# Patient Record
Sex: MALE | NOT HISPANIC OR LATINO | URBAN - METROPOLITAN AREA
[De-identification: names, ages, dates, MRNs, and addresses within clinical notes are randomized per-mention and may not be internally consistent; named-entity substitution may affect disease eponyms.]

---

## 2024-11-18 ENCOUNTER — ANESTHESIA (OUTPATIENT)
Dept: SURGERY | Facility: HOSPITAL | Age: 49
End: 2024-11-18
Payer: MEDICARE

## 2024-11-18 ENCOUNTER — ANESTHESIA EVENT (OUTPATIENT)
Dept: SURGERY | Facility: HOSPITAL | Age: 49
End: 2024-11-18
Payer: MEDICARE

## 2024-11-18 ENCOUNTER — HOSPITAL ENCOUNTER (INPATIENT)
Facility: HOSPITAL | Age: 49
LOS: 17 days | Discharge: NURSING, REHAB OR LONG-TERM CARE | DRG: 041 | End: 2024-12-05
Attending: STUDENT IN AN ORGANIZED HEALTH CARE EDUCATION/TRAINING PROGRAM | Admitting: SURGERY
Payer: MEDICARE

## 2024-11-18 DIAGNOSIS — S02.91XA CLOSED FRACTURE OF SKULL, UNSPECIFIED BONE, INITIAL ENCOUNTER: ICD-10-CM

## 2024-11-18 DIAGNOSIS — T14.90XA TRAUMA: ICD-10-CM

## 2024-11-18 DIAGNOSIS — S51.011S ELBOW LACERATION, RIGHT, SEQUELA: ICD-10-CM

## 2024-11-18 DIAGNOSIS — S06.5XAA SDH (SUBDURAL HEMATOMA): ICD-10-CM

## 2024-11-18 DIAGNOSIS — S82.251A CLOSED DISPLACED COMMINUTED FRACTURE OF SHAFT OF RIGHT TIBIA, INITIAL ENCOUNTER: ICD-10-CM

## 2024-11-18 DIAGNOSIS — S53.431A COMPLETE TEAR OF LATERAL COLLATERAL LIGAMENT OF RIGHT ELBOW: ICD-10-CM

## 2024-11-18 DIAGNOSIS — S82.451A CLOSED DISPLACED COMMINUTED FRACTURE OF SHAFT OF RIGHT FIBULA, INITIAL ENCOUNTER: ICD-10-CM

## 2024-11-18 DIAGNOSIS — R07.9 CHEST PAIN: ICD-10-CM
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
## 2024-11-18 PROBLEM — S82.401A FRACTURE OF RIGHT FIBULA: Status: ACTIVE | Noted: 2024-11-18

## 2024-11-18 PROBLEM — S56.521A: Status: ACTIVE | Noted: 2024-11-18

## 2024-11-18 PROBLEM — Z96.9 RETAINED ORTHOPEDIC HARDWARE: Status: ACTIVE | Noted: 2024-11-18

## 2024-11-18 PROBLEM — S51.011A LACERATION OF RIGHT ELBOW: Status: ACTIVE | Noted: 2024-11-18

## 2024-11-18 PROBLEM — S82.201A FRACTURE OF RIGHT TIBIA: Status: ACTIVE | Noted: 2024-11-18

## 2024-11-18 PROBLEM — I60.9 SAH (SUBARACHNOID HEMORRHAGE): Status: ACTIVE | Noted: 2024-11-18

## 2024-11-18 LAB
ABORH RETYPE: NORMAL
ABS NEUT (OLG): 15.48 X10(3)/MCL (ref 2.1–9.2)
ALBUMIN SERPL-MCNC: 3.9 G/DL (ref 3.5–5)
ALBUMIN/GLOB SERPL: 1.5 RATIO (ref 1.1–2)
ALP SERPL-CCNC: 60 UNIT/L (ref 40–150)
ALT SERPL-CCNC: 23 UNIT/L (ref 0–55)
AMPHET UR QL SCN: POSITIVE
ANION GAP SERPL CALC-SCNC: 9 MEQ/L
APTT PPP: 24.9 SECONDS (ref 23.2–33.7)
AST SERPL-CCNC: 34 UNIT/L (ref 5–34)
BACTERIA #/AREA URNS AUTO: ABNORMAL /HPF
BARBITURATE SCN PRESENT UR: NEGATIVE
BENZODIAZ UR QL SCN: POSITIVE
BILIRUB SERPL-MCNC: 0.4 MG/DL
BILIRUB UR QL STRIP.AUTO: NEGATIVE
BUN SERPL-MCNC: 14.8 MG/DL (ref 8.9–20.6)
CALCIUM SERPL-MCNC: 9.1 MG/DL (ref 8.4–10.2)
CANNABINOIDS UR QL SCN: NEGATIVE
CHLORIDE SERPL-SCNC: 106 MMOL/L (ref 98–107)
CLARITY UR: CLEAR
CO2 SERPL-SCNC: 27 MMOL/L (ref 22–29)
COCAINE UR QL SCN: NEGATIVE
COLOR UR AUTO: COLORLESS
CREAT SERPL-MCNC: 0.81 MG/DL (ref 0.72–1.25)
CREAT/UREA NIT SERPL: 18
EOSINOPHIL NFR BLD MANUAL: 0.19 X10(3)/MCL (ref 0–0.9)
EOSINOPHIL NFR BLD MANUAL: 1 %
ERYTHROCYTE [DISTWIDTH] IN BLOOD BY AUTOMATED COUNT: 13.4 % (ref 11.5–17)
ETHANOL SERPL-MCNC: <10 MG/DL
FENTANYL UR QL SCN: NEGATIVE
GFR SERPLBLD CREATININE-BSD FMLA CKD-EPI: >60 ML/MIN/1.73/M2
GLOBULIN SER-MCNC: 2.6 GM/DL (ref 2.4–3.5)
GLUCOSE SERPL-MCNC: 127 MG/DL (ref 74–100)
GLUCOSE UR QL STRIP: NORMAL
GROUP & RH: NORMAL
HCT VFR BLD AUTO: 36.8 % (ref 42–52)
HCT VFR BLD AUTO: NORMAL %
HGB BLD-MCNC: 12.3 G/DL (ref 14–18)
HGB BLD-MCNC: NORMAL G/DL
HGB UR QL STRIP: NEGATIVE
INDIRECT COOMBS: NORMAL
INR PPP: 1
INSTRUMENT WBC (OLG): 19.11 X10(3)/MCL
KETONES UR QL STRIP: NEGATIVE
LACTATE SERPL-SCNC: 2.9 MMOL/L (ref 0.5–2.2)
LACTATE SERPL-SCNC: 2.9 MMOL/L (ref 0.5–2.2)
LACTATE SERPL-SCNC: 5.3 MMOL/L (ref 0.5–2.2)
LACTATE SERPL-SCNC: 6.7 MMOL/L (ref 0.5–2.2)
LEUKOCYTE ESTERASE UR QL STRIP: NEGATIVE
LYMPHOCYTES NFR BLD MANUAL: 0.96 X10(3)/MCL
LYMPHOCYTES NFR BLD MANUAL: 5 %
MCH RBC QN AUTO: 30.8 PG (ref 27–31)
MCHC RBC AUTO-ENTMCNC: 33.4 G/DL (ref 33–36)
MCV RBC AUTO: 92.2 FL (ref 80–94)
MDMA UR QL SCN: NEGATIVE
MONOCYTES NFR BLD MANUAL: 13 %
MONOCYTES NFR BLD MANUAL: 2.48 X10(3)/MCL (ref 0.1–1.3)
NEUTROPHILS NFR BLD MANUAL: 81 %
NITRITE UR QL STRIP: NEGATIVE
NRBC BLD AUTO-RTO: 0 %
OPIATES UR QL SCN: POSITIVE
PCP UR QL: NEGATIVE
PH UR STRIP: 8.5 [PH]
PH UR: 8.5 [PH] (ref 3–11)
PLATELET # BLD AUTO: 448 X10(3)/MCL (ref 130–400)
PLATELET # BLD EST: ABNORMAL 10*3/UL
PMV BLD AUTO: 9.7 FL (ref 7.4–10.4)
POTASSIUM SERPL-SCNC: 3.8 MMOL/L (ref 3.5–5.1)
PROT SERPL-MCNC: 6.5 GM/DL (ref 6.4–8.3)
PROT UR QL STRIP: ABNORMAL
PROTHROMBIN TIME: 12.6 SECONDS (ref 12.5–14.5)
RBC # BLD AUTO: 3.99 X10(6)/MCL (ref 4.7–6.1)
RBC #/AREA URNS AUTO: ABNORMAL /HPF
RBC MORPH BLD: NORMAL
SODIUM SERPL-SCNC: 142 MMOL/L (ref 136–145)
SP GR UR STRIP.AUTO: 1.03 (ref 1–1.03)
SPECIFIC GRAVITY, URINE AUTO (.000) (OHS): 1.03 (ref 1–1.03)
SPECIMEN OUTDATE: NORMAL
SPERM URNS QL MICRO: ABNORMAL /HPF
SQUAMOUS #/AREA URNS LPF: ABNORMAL /HPF
UROBILINOGEN UR STRIP-ACNC: NORMAL
WBC # BLD AUTO: 19.11 X10(3)/MCL (ref 4.5–11.5)
WBC #/AREA URNS AUTO: ABNORMAL /HPF

## 2024-11-18 PROCEDURE — 25000003 PHARM REV CODE 250: Performed by: NURSE ANESTHETIST, CERTIFIED REGISTERED

## 2024-11-18 PROCEDURE — 99223 1ST HOSP IP/OBS HIGH 75: CPT | Mod: 57,FS,, | Performed by: NURSE PRACTITIONER

## 2024-11-18 PROCEDURE — 24343 REPR ELBOW LAT LIGMNT W/TISS: CPT | Mod: 51,RT,, | Performed by: ORTHOPAEDIC SURGERY

## 2024-11-18 PROCEDURE — 85027 COMPLETE CBC AUTOMATED: CPT | Performed by: STUDENT IN AN ORGANIZED HEALTH CARE EDUCATION/TRAINING PROGRAM

## 2024-11-18 PROCEDURE — 86900 BLOOD TYPING SEROLOGIC ABO: CPT | Performed by: STUDENT IN AN ORGANIZED HEALTH CARE EDUCATION/TRAINING PROGRAM

## 2024-11-18 PROCEDURE — 63600175 PHARM REV CODE 636 W HCPCS

## 2024-11-18 PROCEDURE — 36415 COLL VENOUS BLD VENIPUNCTURE: CPT | Performed by: STUDENT IN AN ORGANIZED HEALTH CARE EDUCATION/TRAINING PROGRAM

## 2024-11-18 PROCEDURE — 63600175 PHARM REV CODE 636 W HCPCS: Performed by: NURSE ANESTHETIST, CERTIFIED REGISTERED

## 2024-11-18 PROCEDURE — C1769 GUIDE WIRE: HCPCS | Performed by: ORTHOPAEDIC SURGERY

## 2024-11-18 PROCEDURE — 20800000 HC ICU TRAUMA

## 2024-11-18 PROCEDURE — 63600175 PHARM REV CODE 636 W HCPCS: Performed by: SURGERY

## 2024-11-18 PROCEDURE — 0LQ50ZZ REPAIR RIGHT LOWER ARM AND WRIST TENDON, OPEN APPROACH: ICD-10-PCS | Performed by: ORTHOPAEDIC SURGERY

## 2024-11-18 PROCEDURE — 25270 REPAIR FOREARM TENDON/MUSCLE: CPT | Mod: 51,AS,RT, | Performed by: NURSE PRACTITIONER

## 2024-11-18 PROCEDURE — 89220 SPUTUM SPECIMEN COLLECTION: CPT

## 2024-11-18 PROCEDURE — 96374 THER/PROPH/DIAG INJ IV PUSH: CPT

## 2024-11-18 PROCEDURE — 25000003 PHARM REV CODE 250

## 2024-11-18 PROCEDURE — 99900035 HC TECH TIME PER 15 MIN (STAT)

## 2024-11-18 PROCEDURE — 27200966 HC CLOSED SUCTION SYSTEM

## 2024-11-18 PROCEDURE — 99900031 HC PATIENT EDUCATION (STAT)

## 2024-11-18 PROCEDURE — 36000710: Performed by: ORTHOPAEDIC SURGERY

## 2024-11-18 PROCEDURE — 0QSG06Z REPOSITION RIGHT TIBIA WITH INTRAMEDULLARY INTERNAL FIXATION DEVICE, OPEN APPROACH: ICD-10-PCS | Performed by: ORTHOPAEDIC SURGERY

## 2024-11-18 PROCEDURE — 85730 THROMBOPLASTIN TIME PARTIAL: CPT | Performed by: STUDENT IN AN ORGANIZED HEALTH CARE EDUCATION/TRAINING PROGRAM

## 2024-11-18 PROCEDURE — 99223 1ST HOSP IP/OBS HIGH 75: CPT | Mod: AI,,,

## 2024-11-18 PROCEDURE — 83605 ASSAY OF LACTIC ACID: CPT | Performed by: STUDENT IN AN ORGANIZED HEALTH CARE EDUCATION/TRAINING PROGRAM

## 2024-11-18 PROCEDURE — 0BH17EZ INSERTION OF ENDOTRACHEAL AIRWAY INTO TRACHEA, VIA NATURAL OR ARTIFICIAL OPENING: ICD-10-PCS | Performed by: STUDENT IN AN ORGANIZED HEALTH CARE EDUCATION/TRAINING PROGRAM

## 2024-11-18 PROCEDURE — 94760 N-INVAS EAR/PLS OXIMETRY 1: CPT

## 2024-11-18 PROCEDURE — 99900026 HC AIRWAY MAINTENANCE (STAT)

## 2024-11-18 PROCEDURE — D9220A PRA ANESTHESIA: Mod: ANES,,, | Performed by: ANESTHESIOLOGY

## 2024-11-18 PROCEDURE — 24343 REPR ELBOW LAT LIGMNT W/TISS: CPT | Mod: 51,AS,RT, | Performed by: NURSE PRACTITIONER

## 2024-11-18 PROCEDURE — 99221 1ST HOSP IP/OBS SF/LOW 40: CPT | Mod: FS,57,, | Performed by: ORTHOPAEDIC SURGERY

## 2024-11-18 PROCEDURE — 63600175 PHARM REV CODE 636 W HCPCS: Performed by: ORTHOPAEDIC SURGERY

## 2024-11-18 PROCEDURE — C1713 ANCHOR/SCREW BN/BN,TIS/BN: HCPCS | Performed by: ORTHOPAEDIC SURGERY

## 2024-11-18 PROCEDURE — 36000711: Performed by: ORTHOPAEDIC SURGERY

## 2024-11-18 PROCEDURE — 51702 INSERT TEMP BLADDER CATH: CPT

## 2024-11-18 PROCEDURE — D9220A PRA ANESTHESIA: Mod: CRNA,,, | Performed by: NURSE ANESTHETIST, CERTIFIED REGISTERED

## 2024-11-18 PROCEDURE — 27100171 HC OXYGEN HIGH FLOW UP TO 24 HOURS

## 2024-11-18 PROCEDURE — 5A1945Z RESPIRATORY VENTILATION, 24-96 CONSECUTIVE HOURS: ICD-10-PCS | Performed by: STUDENT IN AN ORGANIZED HEALTH CARE EDUCATION/TRAINING PROGRAM

## 2024-11-18 PROCEDURE — 85014 HEMATOCRIT: CPT | Performed by: SURGERY

## 2024-11-18 PROCEDURE — 27759 TREATMENT OF TIBIA FRACTURE: CPT | Mod: RT,,, | Performed by: ORTHOPAEDIC SURGERY

## 2024-11-18 PROCEDURE — 80307 DRUG TEST PRSMV CHEM ANLYZR: CPT | Performed by: STUDENT IN AN ORGANIZED HEALTH CARE EDUCATION/TRAINING PROGRAM

## 2024-11-18 PROCEDURE — 94761 N-INVAS EAR/PLS OXIMETRY MLT: CPT

## 2024-11-18 PROCEDURE — 0KB90ZZ EXCISION OF RIGHT LOWER ARM AND WRIST MUSCLE, OPEN APPROACH: ICD-10-PCS | Performed by: ORTHOPAEDIC SURGERY

## 2024-11-18 PROCEDURE — 37000008 HC ANESTHESIA 1ST 15 MINUTES: Performed by: ORTHOPAEDIC SURGERY

## 2024-11-18 PROCEDURE — 31500 INSERT EMERGENCY AIRWAY: CPT

## 2024-11-18 PROCEDURE — 25000003 PHARM REV CODE 250: Performed by: SURGERY

## 2024-11-18 PROCEDURE — 25500020 PHARM REV CODE 255: Performed by: SURGERY

## 2024-11-18 PROCEDURE — 36415 COLL VENOUS BLD VENIPUNCTURE: CPT | Performed by: SURGERY

## 2024-11-18 PROCEDURE — 81015 MICROSCOPIC EXAM OF URINE: CPT | Performed by: STUDENT IN AN ORGANIZED HEALTH CARE EDUCATION/TRAINING PROGRAM

## 2024-11-18 PROCEDURE — 83605 ASSAY OF LACTIC ACID: CPT | Performed by: SURGERY

## 2024-11-18 PROCEDURE — 99223 1ST HOSP IP/OBS HIGH 75: CPT | Mod: FS,,, | Performed by: STUDENT IN AN ORGANIZED HEALTH CARE EDUCATION/TRAINING PROGRAM

## 2024-11-18 PROCEDURE — 85610 PROTHROMBIN TIME: CPT | Performed by: STUDENT IN AN ORGANIZED HEALTH CARE EDUCATION/TRAINING PROGRAM

## 2024-11-18 PROCEDURE — G0390 TRAUMA RESPONS W/HOSP CRITI: HCPCS

## 2024-11-18 PROCEDURE — 0SPF04Z REMOVAL OF INTERNAL FIXATION DEVICE FROM RIGHT ANKLE JOINT, OPEN APPROACH: ICD-10-PCS | Performed by: ORTHOPAEDIC SURGERY

## 2024-11-18 PROCEDURE — 99285 EMERGENCY DEPT VISIT HI MDM: CPT | Mod: 25

## 2024-11-18 PROCEDURE — 27201423 OPTIME MED/SURG SUP & DEVICES STERILE SUPPLY: Performed by: ORTHOPAEDIC SURGERY

## 2024-11-18 PROCEDURE — 27759 TREATMENT OF TIBIA FRACTURE: CPT | Mod: AS,RT,, | Performed by: NURSE PRACTITIONER

## 2024-11-18 PROCEDURE — 0MQ30ZZ REPAIR RIGHT ELBOW BURSA AND LIGAMENT, OPEN APPROACH: ICD-10-PCS | Performed by: ORTHOPAEDIC SURGERY

## 2024-11-18 PROCEDURE — 3E0234Z INTRODUCTION OF SERUM, TOXOID AND VACCINE INTO MUSCLE, PERCUTANEOUS APPROACH: ICD-10-PCS | Performed by: SURGERY

## 2024-11-18 PROCEDURE — 80053 COMPREHEN METABOLIC PANEL: CPT | Performed by: STUDENT IN AN ORGANIZED HEALTH CARE EDUCATION/TRAINING PROGRAM

## 2024-11-18 PROCEDURE — 25270 REPAIR FOREARM TENDON/MUSCLE: CPT | Mod: 51,RT,, | Performed by: ORTHOPAEDIC SURGERY

## 2024-11-18 PROCEDURE — 25000003 PHARM REV CODE 250: Performed by: STUDENT IN AN ORGANIZED HEALTH CARE EDUCATION/TRAINING PROGRAM

## 2024-11-18 PROCEDURE — 0RCL0ZZ EXTIRPATION OF MATTER FROM RIGHT ELBOW JOINT, OPEN APPROACH: ICD-10-PCS | Performed by: ORTHOPAEDIC SURGERY

## 2024-11-18 PROCEDURE — 82077 ASSAY SPEC XCP UR&BREATH IA: CPT | Performed by: STUDENT IN AN ORGANIZED HEALTH CARE EDUCATION/TRAINING PROGRAM

## 2024-11-18 PROCEDURE — 37000009 HC ANESTHESIA EA ADD 15 MINS: Performed by: ORTHOPAEDIC SURGERY

## 2024-11-18 PROCEDURE — 20000000 HC ICU ROOM

## 2024-11-18 PROCEDURE — 94002 VENT MGMT INPAT INIT DAY: CPT

## 2024-11-18 DEVICE — LOW PROF LCKNG SCREW F/IM NAIL Ø 5.0MM / L 30MM / XL25/ STER: Type: IMPLANTABLE DEVICE | Site: TIBIA | Status: FUNCTIONAL

## 2024-11-18 DEVICE — LOW PROF LCKNG SCREW F/IM NAIL Ø 5.0MM / L 66MM / XL25/ STER: Type: IMPLANTABLE DEVICE | Site: TIBIA | Status: FUNCTIONAL

## 2024-11-18 DEVICE — LOCKING SCREW FOR IM NAIL Ø 5MM/ 40MM/ XL25/ STERILE: Type: IMPLANTABLE DEVICE | Site: TIBIA | Status: FUNCTIONAL

## 2024-11-18 DEVICE — LOW PROF LCKNG SCREW F/IM NAIL Ø 5.0MM / L 60MM / XL25/ STER: Type: IMPLANTABLE DEVICE | Site: TIBIA | Status: FUNCTIONAL

## 2024-11-18 DEVICE — LOW PROF LCKNG SCREW F/IM NAIL Ø 5.0MM / L 34MM / XL25/ STER: Type: IMPLANTABLE DEVICE | Site: TIBIA | Status: FUNCTIONAL

## 2024-11-18 DEVICE — LOW PROF LCKNG SCREW F/IM NAIL Ø 5.0MM / L 36MM / XL25/ STER: Type: IMPLANTABLE DEVICE | Site: TIBIA | Status: FUNCTIONAL

## 2024-11-18 DEVICE — TIBIAL NAIL-ADVANCED / 10MM 360MM / STERILE: Type: IMPLANTABLE DEVICE | Site: TIBIA | Status: FUNCTIONAL

## 2024-11-18 RX ORDER — FENTANYL CITRATE 50 UG/ML
INJECTION, SOLUTION INTRAMUSCULAR; INTRAVENOUS
Status: DISCONTINUED | OUTPATIENT
Start: 2024-11-18 | End: 2024-11-18

## 2024-11-18 RX ORDER — DEXMEDETOMIDINE HYDROCHLORIDE 4 UG/ML
INJECTION, SOLUTION INTRAVENOUS
Status: DISPENSED
Start: 2024-11-18 | End: 2024-11-18

## 2024-11-18 RX ORDER — MORPHINE SULFATE 4 MG/ML
2 INJECTION, SOLUTION INTRAMUSCULAR; INTRAVENOUS
Status: DISCONTINUED | OUTPATIENT
Start: 2024-11-18 | End: 2024-11-19

## 2024-11-18 RX ORDER — DOCUSATE SODIUM 100 MG/1
100 CAPSULE, LIQUID FILLED ORAL 2 TIMES DAILY
Status: DISCONTINUED | OUTPATIENT
Start: 2024-11-18 | End: 2024-11-20

## 2024-11-18 RX ORDER — LEVETIRACETAM 500 MG/1
500 TABLET ORAL 2 TIMES DAILY
Status: DISCONTINUED | OUTPATIENT
Start: 2024-11-18 | End: 2024-11-18

## 2024-11-18 RX ORDER — TALC
6 POWDER (GRAM) TOPICAL NIGHTLY PRN
Status: DISCONTINUED | OUTPATIENT
Start: 2024-11-18 | End: 2024-12-05 | Stop reason: HOSPADM

## 2024-11-18 RX ORDER — HYDRALAZINE HYDROCHLORIDE 20 MG/ML
20 INJECTION INTRAMUSCULAR; INTRAVENOUS EVERY 4 HOURS PRN
Status: DISCONTINUED | OUTPATIENT
Start: 2024-11-18 | End: 2024-12-03

## 2024-11-18 RX ORDER — OXYCODONE HYDROCHLORIDE 5 MG/1
5 TABLET ORAL EVERY 4 HOURS PRN
Status: DISCONTINUED | OUTPATIENT
Start: 2024-11-18 | End: 2024-12-03

## 2024-11-18 RX ORDER — LEVETIRACETAM 500 MG/5ML
500 INJECTION, SOLUTION, CONCENTRATE INTRAVENOUS EVERY 12 HOURS
Status: DISCONTINUED | OUTPATIENT
Start: 2024-11-18 | End: 2024-11-23

## 2024-11-18 RX ORDER — POLYETHYLENE GLYCOL 3350 17 G/17G
17 POWDER, FOR SOLUTION ORAL 2 TIMES DAILY
Status: DISCONTINUED | OUTPATIENT
Start: 2024-11-18 | End: 2024-11-20

## 2024-11-18 RX ORDER — PHENYLEPHRINE HCL IN 0.9% NACL 1 MG/10 ML
SYRINGE (ML) INTRAVENOUS
Status: DISCONTINUED | OUTPATIENT
Start: 2024-11-18 | End: 2024-11-18

## 2024-11-18 RX ORDER — CEFAZOLIN 2 G/1
INJECTION, POWDER, FOR SOLUTION INTRAMUSCULAR; INTRAVENOUS
Status: DISCONTINUED | OUTPATIENT
Start: 2024-11-18 | End: 2024-11-18

## 2024-11-18 RX ORDER — FAMOTIDINE 10 MG/ML
20 INJECTION INTRAVENOUS 2 TIMES DAILY
Status: COMPLETED | OUTPATIENT
Start: 2024-11-18 | End: 2024-11-21

## 2024-11-18 RX ORDER — ETOMIDATE 2 MG/ML
20 INJECTION INTRAVENOUS
Status: COMPLETED | OUTPATIENT
Start: 2024-11-18 | End: 2024-11-18

## 2024-11-18 RX ORDER — ROCURONIUM BROMIDE 10 MG/ML
INJECTION, SOLUTION INTRAVENOUS
Status: DISCONTINUED | OUTPATIENT
Start: 2024-11-18 | End: 2024-11-18

## 2024-11-18 RX ORDER — LABETALOL HYDROCHLORIDE 5 MG/ML
20 INJECTION, SOLUTION INTRAVENOUS EVERY 4 HOURS PRN
Status: DISCONTINUED | OUTPATIENT
Start: 2024-11-18 | End: 2024-12-03

## 2024-11-18 RX ORDER — PROPOFOL 10 MG/ML
INJECTION, EMULSION INTRAVENOUS
Status: COMPLETED
Start: 2024-11-18 | End: 2024-11-18

## 2024-11-18 RX ORDER — ROCURONIUM BROMIDE 10 MG/ML
100 INJECTION, SOLUTION INTRAVENOUS ONCE
Status: COMPLETED | OUTPATIENT
Start: 2024-11-18 | End: 2024-11-18

## 2024-11-18 RX ORDER — VANCOMYCIN HYDROCHLORIDE 1 G/20ML
INJECTION, POWDER, LYOPHILIZED, FOR SOLUTION INTRAVENOUS
Status: DISCONTINUED | OUTPATIENT
Start: 2024-11-18 | End: 2024-11-18 | Stop reason: HOSPADM

## 2024-11-18 RX ORDER — MUPIROCIN 20 MG/G
OINTMENT TOPICAL 2 TIMES DAILY
Status: COMPLETED | OUTPATIENT
Start: 2024-11-18 | End: 2024-11-23

## 2024-11-18 RX ORDER — ACETAMINOPHEN 325 MG/1
650 TABLET ORAL EVERY 4 HOURS
Status: DISPENSED | OUTPATIENT
Start: 2024-11-18 | End: 2024-11-23

## 2024-11-18 RX ORDER — DEXMEDETOMIDINE HYDROCHLORIDE 4 UG/ML
0-1.4 INJECTION, SOLUTION INTRAVENOUS CONTINUOUS
Status: DISCONTINUED | OUTPATIENT
Start: 2024-11-18 | End: 2024-11-19

## 2024-11-18 RX ORDER — FAMOTIDINE 20 MG/1
20 TABLET, FILM COATED ORAL 2 TIMES DAILY
Status: DISCONTINUED | OUTPATIENT
Start: 2024-11-18 | End: 2024-11-18

## 2024-11-18 RX ORDER — FENTANYL CITRATE 50 UG/ML
100 INJECTION, SOLUTION INTRAMUSCULAR; INTRAVENOUS
Status: DISCONTINUED | OUTPATIENT
Start: 2024-11-18 | End: 2024-11-18

## 2024-11-18 RX ORDER — PROPOFOL 10 MG/ML
0-50 INJECTION, EMULSION INTRAVENOUS CONTINUOUS
Status: DISCONTINUED | OUTPATIENT
Start: 2024-11-18 | End: 2024-11-19

## 2024-11-18 RX ORDER — PROPOFOL 10 MG/ML
0-50 INJECTION, EMULSION INTRAVENOUS CONTINUOUS
Status: DISCONTINUED | OUTPATIENT
Start: 2024-11-18 | End: 2024-11-18 | Stop reason: HOSPADM

## 2024-11-18 RX ORDER — SODIUM CHLORIDE 9 MG/ML
INJECTION, SOLUTION INTRAVENOUS CONTINUOUS
Status: DISCONTINUED | OUTPATIENT
Start: 2024-11-18 | End: 2024-11-18

## 2024-11-18 RX ORDER — BISACODYL 10 MG/1
10 SUPPOSITORY RECTAL DAILY PRN
Status: DISCONTINUED | OUTPATIENT
Start: 2024-11-18 | End: 2024-12-05 | Stop reason: HOSPADM

## 2024-11-18 RX ORDER — FENTANYL CITRATE-0.9 % NACL/PF 10 MCG/ML
0-250 PLASTIC BAG, INJECTION (ML) INTRAVENOUS CONTINUOUS
Status: DISCONTINUED | OUTPATIENT
Start: 2024-11-18 | End: 2024-11-19

## 2024-11-18 RX ORDER — SODIUM CHLORIDE, SODIUM LACTATE, POTASSIUM CHLORIDE, CALCIUM CHLORIDE 600; 310; 30; 20 MG/100ML; MG/100ML; MG/100ML; MG/100ML
INJECTION, SOLUTION INTRAVENOUS CONTINUOUS
Status: DISCONTINUED | OUTPATIENT
Start: 2024-11-18 | End: 2024-11-20

## 2024-11-18 RX ORDER — CEFAZOLIN 2 G/1
2 INJECTION, POWDER, FOR SOLUTION INTRAMUSCULAR; INTRAVENOUS
Status: COMPLETED | OUTPATIENT
Start: 2024-11-18 | End: 2024-11-22

## 2024-11-18 RX ADMIN — Medication 100 MCG: at 02:11

## 2024-11-18 RX ADMIN — ROCURONIUM BROMIDE 20 MG: 10 SOLUTION INTRAVENOUS at 02:11

## 2024-11-18 RX ADMIN — IOHEXOL 75 ML: 350 INJECTION, SOLUTION INTRAVENOUS at 12:11

## 2024-11-18 RX ADMIN — FAMOTIDINE 20 MG: 10 INJECTION, SOLUTION INTRAVENOUS at 08:11

## 2024-11-18 RX ADMIN — PROPOFOL 10 MCG/KG/MIN: 10 INJECTION, EMULSION INTRAVENOUS at 07:11

## 2024-11-18 RX ADMIN — DEXMEDETOMIDINE HYDROCHLORIDE 0.4 MCG/KG/HR: 400 INJECTION INTRAVENOUS at 10:11

## 2024-11-18 RX ADMIN — Medication 100 MCG: at 01:11

## 2024-11-18 RX ADMIN — SODIUM CHLORIDE, POTASSIUM CHLORIDE, SODIUM LACTATE AND CALCIUM CHLORIDE 1000 ML: 600; 310; 30; 20 INJECTION, SOLUTION INTRAVENOUS at 08:11

## 2024-11-18 RX ADMIN — SODIUM CHLORIDE, POTASSIUM CHLORIDE, SODIUM LACTATE AND CALCIUM CHLORIDE: 600; 310; 30; 20 INJECTION, SOLUTION INTRAVENOUS at 09:11

## 2024-11-18 RX ADMIN — ROCURONIUM BROMIDE 50 MG: 10 SOLUTION INTRAVENOUS at 12:11

## 2024-11-18 RX ADMIN — ROCURONIUM BROMIDE 100 MG: 10 SOLUTION INTRAVENOUS at 07:11

## 2024-11-18 RX ADMIN — MUPIROCIN: 20 OINTMENT TOPICAL at 08:11

## 2024-11-18 RX ADMIN — DOCUSATE SODIUM 100 MG: 100 CAPSULE, LIQUID FILLED ORAL at 08:11

## 2024-11-18 RX ADMIN — PROPOFOL 10 MCG/KG/MIN: 10 INJECTION, EMULSION INTRAVENOUS at 08:11

## 2024-11-18 RX ADMIN — CEFAZOLIN 2 G: 2 INJECTION, POWDER, FOR SOLUTION INTRAMUSCULAR; INTRAVENOUS at 01:11

## 2024-11-18 RX ADMIN — POLYETHYLENE GLYCOL 3350 17 G: 17 POWDER, FOR SOLUTION ORAL at 08:11

## 2024-11-18 RX ADMIN — SODIUM CHLORIDE, POTASSIUM CHLORIDE, SODIUM LACTATE AND CALCIUM CHLORIDE 500 ML: 600; 310; 30; 20 INJECTION, SOLUTION INTRAVENOUS at 10:11

## 2024-11-18 RX ADMIN — CEFAZOLIN 2 G: 2 INJECTION, POWDER, FOR SOLUTION INTRAMUSCULAR; INTRAVENOUS at 08:11

## 2024-11-18 RX ADMIN — LEVETIRACETAM 500 MG: 100 INJECTION, SOLUTION INTRAVENOUS at 08:11

## 2024-11-18 RX ADMIN — SUGAMMADEX 200 MG: 100 INJECTION, SOLUTION INTRAVENOUS at 03:11

## 2024-11-18 RX ADMIN — FENTANYL CITRATE 100 MCG: 50 INJECTION, SOLUTION INTRAMUSCULAR; INTRAVENOUS at 07:11

## 2024-11-18 RX ADMIN — FENTANYL CITRATE 100 MCG: 50 INJECTION, SOLUTION INTRAMUSCULAR; INTRAVENOUS at 11:11

## 2024-11-18 RX ADMIN — DEXMEDETOMIDINE HYDROCHLORIDE 0.4 MCG/KG/HR: 400 INJECTION INTRAVENOUS at 03:11

## 2024-11-18 RX ADMIN — HYDRALAZINE HYDROCHLORIDE 20 MG: 20 INJECTION INTRAMUSCULAR; INTRAVENOUS at 09:11

## 2024-11-18 RX ADMIN — FENTANYL CITRATE 100 MCG: 50 INJECTION, SOLUTION INTRAMUSCULAR; INTRAVENOUS at 01:11

## 2024-11-18 RX ADMIN — ACETAMINOPHEN 650 MG: 325 TABLET, FILM COATED ORAL at 08:11

## 2024-11-18 RX ADMIN — CEFAZOLIN 2 G: 2 INJECTION, POWDER, FOR SOLUTION INTRAMUSCULAR; INTRAVENOUS at 03:11

## 2024-11-18 RX ADMIN — Medication 200 MCG: at 03:11

## 2024-11-18 RX ADMIN — FENTANYL CITRATE 100 MCG/HR: 50 INJECTION, SOLUTION INTRAMUSCULAR; INTRAVENOUS at 09:11

## 2024-11-18 RX ADMIN — PROPOFOL 15 MCG/KG/MIN: 10 INJECTION, EMULSION INTRAVENOUS at 03:11

## 2024-11-18 RX ADMIN — ETOMIDATE 20 MG: 2 INJECTION INTRAVENOUS at 07:11
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
## 2024-11-18 NOTE — ANESTHESIA POSTPROCEDURE EVALUATION
Anesthesia Post Evaluation    Patient: Payam Grande    Procedure(s) Performed: Procedure(s) (LRB):  INSERTION, INTRAMEDULLARY EDWAR, TIBIA (Right)  INCISION AND DRAINAGE, UPPER EXTREMITY (Right)  REMOVAL, HARDWARE (Right)    Final Anesthesia Type: general      Patient location during evaluation: ICU  Patient participation: No - Unable to Participate, Sedation  Level of consciousness: sedated  Post-procedure vital signs: reviewed and stable    PONV status at discharge: No PONV  Anesthetic complications: no      Cardiovascular status: stable  Respiratory status: ventilator and intubated  Hydration status: euvolemic  Follow-up not needed.              Vitals Value Taken Time   /76 11/18/24 1716   Temp     Pulse 87 11/18/24 1718   Resp 20 11/18/24 1718   SpO2 100 % 11/18/24 1718   Vitals shown include unfiled device data.      No case tracking events are documented in the log.      Pain/Anthony Score: Presence of Pain: non-verbal indicators absent (11/18/2024  7:05 AM)  Pain Rating Prior to Med Admin: 8 (11/18/2024 11:25 AM)  Pain Rating Post Med Admin: 0 (11/18/2024 11:47 AM)

## 2024-11-18 NOTE — TRANSFER OF CARE
"Anesthesia Transfer of Care Note    Patient: Payam Grande    Procedure(s) Performed: Procedure(s) (LRB):  INSERTION, INTRAMEDULLARY EDWAR, TIBIA (Right)  INCISION AND DRAINAGE, UPPER EXTREMITY (Right)  REMOVAL, HARDWARE (Right)    Patient location: ICU    Anesthesia Type: general    Transport from OR: Transported from OR intubated on 100% O2 by AMBU with adequate controlled ventilation    Post pain: adequate analgesia    Post assessment: no apparent anesthetic complications    Post vital signs: stable    Level of consciousness: sedated    Nausea/Vomiting: no nausea/vomiting    Complications: none    Transfer of care protocol was followed    Last vitals: Visit Vitals  BP 95/64   Pulse 92   Temp 36.2 °C (97.2 °F) (Core Bladder)   Resp (!) 24   Ht 5' 8" (1.727 m)   Wt 68 kg (149 lb 14.6 oz)   SpO2 100%   BMI 22.79 kg/m²     "

## 2024-11-18 NOTE — ED NOTES
Patient log rolled while maintaining C-spine precautions. No tenderness, step-offs, deformities noted.
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
## 2024-11-18 NOTE — LOPA/MORA/SWTA/AOC/AEB
[facility] ([facility])  Notification of Referral  [facility] Contact # [phone]        Thank you for the referral of this patient to determine suitability for organ, tissue, and eye donation.  A chart review has been conducted (date):2024 at (time) 10:50 AM.    ? Potential candidate for organ donation - [facility] following patient. Any changes in patients condition, discussion of withdrawing the vent or brain death exams, or family mention of donation immediately call [phone]. Refer all organ referrals within 1 hour of meeting the clinical triger of a patient with a neurological, anoxic, or life threatening injury and ONE of the following:    * GCS </= 8    * Loss of 2 or more brain stem reflexes    * Hypothermic Protocol Initiated    * Withdrawal of support discussion regardless of GCS    * Family mention of Donation    ? Potential for candidate for tissue and eye donation- call [facility] at [phone] within 2 hours of death for screening as a potential tissue and/or eye donor.      ? Potential candidate for eye donation - call [facility] at [phone] within 2 hours of death for screening as a potential eye donor.    ? NOT a candidate for organ/tissue/eye donation- call [facility] at [phone] within 2 hours of death to report the time of death.    ? Potential candidate for donation/ Referral Closed- Any changes in patients condition, GCS of 5 or less, discussion of withdrawing the vent, brain death exams, or family mention of donation immediately call [phone].      Screened by: Gabriel Oswald     [facility] Referral Number:  [id number]    Suitable for:  [x]Organ  [x] Tissue  [x] Eye  []Not suitable for Donation    Rule out Reason:     Patient Name: Payam Grande                   49 y.o. male  Patient MRN: [id number]  : 1975  DOD:  TOD:  Cause of Death:     [x]Vented Patient  [facility] representative to approach family if appropriate  []DNR status obtained Referral of critical care  patients when family initiates Do Not Resuscitate  []Cardiac Death   Clinical Support Center to approach family via telephone if appropriate  []Donor Registry  Patient is listed in the Donor Registry    Completed by: Gabriel Oswald
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
## 2024-11-18 NOTE — BRIEF OP NOTE
[facility] - [facility] [address] [facility]  Brief Operative Note    SUMMARY     Surgery Date: 11/18/2024     Surgeons and Role:     * Balwinder Campos MD - Primary    Assisting Surgeon: None    Pre-op Diagnosis:  Closed displaced comminuted fracture of shaft of right fibula, initial encounter [S82.928A]  Laceration of R elbow lateral collateral ligament  Forearm laceration with extensor tendon involvement  Traumatic arthrotomy right elbow with foreign body  Previous orthopedic implants    Post-op Diagnosis:  same    Procedure(s) (LRB):  INSERTION, INTRAMEDULLARY EDWAR, TIBIA (Right)- 50145  Repair of lateral collateral ligament R elbow- 66454  Arthrotomy with I&D and foreign body removal- 61862  Repair extensor tendon right forearm-02266  REMOVAL, HARDWARE deep right ankle (Right)- 20680    Anesthesia: General    Implants:  Implant Name Type Inv. Item Serial No.  Lot No. LRB No. Used Action   NAIL PEELK TIB TI [id number] - SNA  NAIL PEELK TIB TI [id number] [employer] [id number] Right 1 Implanted   SCREW XL25 IM NAIL 40X5MM - SNA  SCREW XL25 IM NAIL 40X5MM [employer] [id number] Right 1 Implanted   SCREW BONE LOCK XL25 60X5MM - SNA  SCREW BONE LOCK XL25 60X5MM [employer] [id number] Right 1 Implanted   SCREW XL25 IM 36X5MM - SNA  SCREW XL25 IM 36X5MM [employer] [id number] Right 1 Implanted   SCREW LP LOCKING XL25 30MM - SNA  SCREW LP LOCKING XL25 30MM [employer] [id number] Right 1 Implanted   SCREW LP LOCKING XL25 66MM - SNA  SCREW LP LOCKING XL25 66MM [employer] [id number] Right 1 Implanted   SCREW LP LOCKING XL25 66MM - SNA  SCREW LP LOCKING XL25 66MM [employer] [id number] Right 1 Implanted   SCREW CONNIE LP 5.0X34MM - SNA  SCREW CONNIE LP 5.0X34MM [employer] [id number] Right 1 Implanted   explanted screws      Right 3 Explanted       Operative Findings: see op report    Estimated Blood Loss: 150 mL    Estimated Blood Loss has been documented.         Specimens:   Specimen (24h ago, onward)      None             [id number]  A/P: Tolerated procedure well. Admit to ICU. WBAT to RLE in boot. Full ROM. NWB RUE except ADLs. Ok for full ROM of elbow in hinged brace. Ancef for 24hrs. Lovenox for DVT ppx.       Balwinder Campos MD  Orthopedic Trauma  [facility]
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
## 2024-11-18 NOTE — PLAN OF CARE
Problem: Adult Inpatient Plan of Care  Goal: Plan of Care Review  Outcome: Not Progressing  Goal: Patient-Specific Goal (Individualized)  Outcome: Not Progressing  Goal: Absence of Hospital-Acquired Illness or Injury  Outcome: Not Progressing  Goal: Optimal Comfort and Wellbeing  Outcome: Not Progressing  Goal: Readiness for Transition of Care  Outcome: Not Progressing     Problem: Skin Injury Risk Increased  Goal: Skin Health and Integrity  Outcome: Not Progressing     Problem: Fall Injury Risk  Goal: Absence of Fall and Fall-Related Injury  Outcome: Not Progressing     Problem: Infection  Goal: Absence of Infection Signs and Symptoms  Outcome: Not Progressing

## 2024-11-19 PROBLEM — J96.01 ACUTE RESPIRATORY FAILURE WITH HYPOXIA: Status: ACTIVE | Noted: 2024-11-19

## 2024-11-19 LAB
ALBUMIN SERPL-MCNC: 2.5 G/DL (ref 3.5–5)
ALBUMIN/GLOB SERPL: 1.1 RATIO (ref 1.1–2)
ALLENS TEST BLOOD GAS (OHS): YES
ALP SERPL-CCNC: 40 UNIT/L (ref 40–150)
ALT SERPL-CCNC: 17 UNIT/L (ref 0–55)
ANION GAP SERPL CALC-SCNC: 6 MEQ/L
AST SERPL-CCNC: 30 UNIT/L (ref 5–34)
BASE EXCESS BLD CALC-SCNC: 1.9 MMOL/L (ref -2–2)
BASOPHILS # BLD AUTO: 0.02 X10(3)/MCL
BASOPHILS NFR BLD AUTO: 0.2 %
BILIRUB SERPL-MCNC: 0.3 MG/DL
BLOOD GAS SAMPLE TYPE (OHS): ABNORMAL
BUN SERPL-MCNC: 13.1 MG/DL (ref 8.9–20.6)
CA-I BLD-SCNC: 1.12 MMOL/L (ref 1.12–1.23)
CALCIUM SERPL-MCNC: 8.1 MG/DL (ref 8.4–10.2)
CHLORIDE SERPL-SCNC: 108 MMOL/L (ref 98–107)
CO2 BLDA-SCNC: 26.6 MMOL/L
CO2 SERPL-SCNC: 23 MMOL/L (ref 22–29)
COHGB MFR BLDA: 1.8 % (ref 0.5–1.5)
CREAT SERPL-MCNC: 0.71 MG/DL (ref 0.72–1.25)
CREAT/UREA NIT SERPL: 18
DRAWN BY BLOOD GAS (OHS): ABNORMAL
EOSINOPHIL # BLD AUTO: 0.01 X10(3)/MCL (ref 0–0.9)
EOSINOPHIL NFR BLD AUTO: 0.1 %
ERYTHROCYTE [DISTWIDTH] IN BLOOD BY AUTOMATED COUNT: 13.5 % (ref 11.5–17)
GFR SERPLBLD CREATININE-BSD FMLA CKD-EPI: >60 ML/MIN/1.73/M2
GLOBULIN SER-MCNC: 2.2 GM/DL (ref 2.4–3.5)
GLUCOSE SERPL-MCNC: 112 MG/DL (ref 74–100)
HCO3 BLDA-SCNC: 25.5 MMOL/L (ref 22–26)
HCT VFR BLD AUTO: 25.3 % (ref 42–52)
HGB BLD-MCNC: 8.6 G/DL (ref 14–18)
IMM GRANULOCYTES # BLD AUTO: 0.02 X10(3)/MCL (ref 0–0.04)
IMM GRANULOCYTES NFR BLD AUTO: 0.2 %
INHALED O2 CONCENTRATION: 30 %
LACTATE SERPL-SCNC: 1.8 MMOL/L (ref 0.5–2.2)
LYMPHOCYTES # BLD AUTO: 1.32 X10(3)/MCL (ref 0.6–4.6)
LYMPHOCYTES NFR BLD AUTO: 12.9 %
MCH RBC QN AUTO: 30.9 PG (ref 27–31)
MCHC RBC AUTO-ENTMCNC: 34 G/DL (ref 33–36)
MCV RBC AUTO: 91 FL (ref 80–94)
MECH RR (OHS): 20 B/MIN
METHGB MFR BLDA: 1.1 % (ref 0.4–1.5)
MODE (OHS): AC
MONOCYTES # BLD AUTO: 1.36 X10(3)/MCL (ref 0.1–1.3)
MONOCYTES NFR BLD AUTO: 13.3 %
NEUTROPHILS # BLD AUTO: 7.48 X10(3)/MCL (ref 2.1–9.2)
NEUTROPHILS NFR BLD AUTO: 73.3 %
NRBC BLD AUTO-RTO: 0 %
O2 HB BLOOD GAS (OHS): 97.4 % (ref 94–97)
OXYGEN DEVICE BLOOD GAS (OHS): ABNORMAL
OXYHGB MFR BLDA: 8.7 G/DL (ref 12–16)
PCO2 BLDA: 35 MMHG (ref 35–45)
PEEP RESPIRATORY: 5 CMH2O
PH BLDA: 7.47 [PH] (ref 7.35–7.45)
PLATELET # BLD AUTO: 285 X10(3)/MCL (ref 130–400)
PMV BLD AUTO: 9.9 FL (ref 7.4–10.4)
PO2 BLDA: 132 MMHG (ref 80–100)
POTASSIUM BLOOD GAS (OHS): 3.7 MMOL/L (ref 3.5–5)
POTASSIUM SERPL-SCNC: 3.3 MMOL/L (ref 3.5–5.1)
PROT SERPL-MCNC: 4.7 GM/DL (ref 6.4–8.3)
RBC # BLD AUTO: 2.78 X10(6)/MCL (ref 4.7–6.1)
SAMPLE SITE BLOOD GAS (OHS): ABNORMAL
SAO2 % BLDA: 99.2 %
SODIUM BLOOD GAS (OHS): 131 MMOL/L (ref 137–145)
SODIUM SERPL-SCNC: 137 MMOL/L (ref 136–145)
SPONT+MECH VT ON VENT: 500 ML
WBC # BLD AUTO: 10.21 X10(3)/MCL (ref 4.5–11.5)

## 2024-11-19 PROCEDURE — 20800000 HC ICU TRAUMA

## 2024-11-19 PROCEDURE — 80053 COMPREHEN METABOLIC PANEL: CPT

## 2024-11-19 PROCEDURE — 99900031 HC PATIENT EDUCATION (STAT)

## 2024-11-19 PROCEDURE — 63600175 PHARM REV CODE 636 W HCPCS: Performed by: SURGERY

## 2024-11-19 PROCEDURE — 25000003 PHARM REV CODE 250

## 2024-11-19 PROCEDURE — 36415 COLL VENOUS BLD VENIPUNCTURE: CPT

## 2024-11-19 PROCEDURE — 99900026 HC AIRWAY MAINTENANCE (STAT)

## 2024-11-19 PROCEDURE — 94760 N-INVAS EAR/PLS OXIMETRY 1: CPT | Mod: XB

## 2024-11-19 PROCEDURE — 99900035 HC TECH TIME PER 15 MIN (STAT)

## 2024-11-19 PROCEDURE — 36600 WITHDRAWAL OF ARTERIAL BLOOD: CPT

## 2024-11-19 PROCEDURE — 82803 BLOOD GASES ANY COMBINATION: CPT

## 2024-11-19 PROCEDURE — 99291 CRITICAL CARE FIRST HOUR: CPT | Mod: ,,, | Performed by: SURGERY

## 2024-11-19 PROCEDURE — 25000003 PHARM REV CODE 250: Performed by: SURGERY

## 2024-11-19 PROCEDURE — 63600175 PHARM REV CODE 636 W HCPCS

## 2024-11-19 PROCEDURE — 85025 COMPLETE CBC W/AUTO DIFF WBC: CPT

## 2024-11-19 PROCEDURE — 94761 N-INVAS EAR/PLS OXIMETRY MLT: CPT

## 2024-11-19 PROCEDURE — 94003 VENT MGMT INPAT SUBQ DAY: CPT

## 2024-11-19 PROCEDURE — 27100171 HC OXYGEN HIGH FLOW UP TO 24 HOURS

## 2024-11-19 RX ORDER — PROPRANOLOL HYDROCHLORIDE 20 MG/1
20 TABLET ORAL 2 TIMES DAILY
Status: DISCONTINUED | OUTPATIENT
Start: 2024-11-19 | End: 2024-12-03

## 2024-11-19 RX ORDER — DEXMEDETOMIDINE HYDROCHLORIDE 4 UG/ML
0-1.4 INJECTION, SOLUTION INTRAVENOUS CONTINUOUS
Status: DISCONTINUED | OUTPATIENT
Start: 2024-11-19 | End: 2024-11-20

## 2024-11-19 RX ORDER — MORPHINE SULFATE 4 MG/ML
4 INJECTION, SOLUTION INTRAMUSCULAR; INTRAVENOUS
Status: DISCONTINUED | OUTPATIENT
Start: 2024-11-19 | End: 2024-11-20

## 2024-11-19 RX ORDER — AMANTADINE HYDROCHLORIDE 50 MG/5ML
100 SOLUTION ORAL DAILY
Status: DISCONTINUED | OUTPATIENT
Start: 2024-11-19 | End: 2024-11-26

## 2024-11-19 RX ADMIN — CEFAZOLIN 2 G: 2 INJECTION, POWDER, FOR SOLUTION INTRAMUSCULAR; INTRAVENOUS at 05:11

## 2024-11-19 RX ADMIN — PROPOFOL 40 MCG/KG/MIN: 10 INJECTION, EMULSION INTRAVENOUS at 04:11

## 2024-11-19 RX ADMIN — DEXMEDETOMIDINE HYDROCHLORIDE 0.4 MCG/KG/HR: 400 INJECTION INTRAVENOUS at 10:11

## 2024-11-19 RX ADMIN — ACETAMINOPHEN 650 MG: 325 TABLET, FILM COATED ORAL at 02:11

## 2024-11-19 RX ADMIN — ACETAMINOPHEN 650 MG: 325 TABLET, FILM COATED ORAL at 08:11

## 2024-11-19 RX ADMIN — PROPRANOLOL HYDROCHLORIDE 20 MG: 20 TABLET ORAL at 08:11

## 2024-11-19 RX ADMIN — LEVETIRACETAM 500 MG: 100 INJECTION, SOLUTION INTRAVENOUS at 08:11

## 2024-11-19 RX ADMIN — MUPIROCIN: 20 OINTMENT TOPICAL at 08:11

## 2024-11-19 RX ADMIN — POLYETHYLENE GLYCOL 3350 17 G: 17 POWDER, FOR SOLUTION ORAL at 08:11

## 2024-11-19 RX ADMIN — ACETAMINOPHEN 650 MG: 325 TABLET, FILM COATED ORAL at 05:11

## 2024-11-19 RX ADMIN — FAMOTIDINE 20 MG: 10 INJECTION, SOLUTION INTRAVENOUS at 08:11

## 2024-11-19 RX ADMIN — AMANTADINE HYDROCHLORIDE 100 MG: 50 SOLUTION ORAL at 12:11

## 2024-11-19 RX ADMIN — CEFAZOLIN 2 G: 2 INJECTION, POWDER, FOR SOLUTION INTRAMUSCULAR; INTRAVENOUS at 12:11

## 2024-11-19 RX ADMIN — POTASSIUM BICARBONATE 40 MEQ: 391 TABLET, EFFERVESCENT ORAL at 07:11

## 2024-11-19 RX ADMIN — PROPOFOL 45 MCG/KG/MIN: 10 INJECTION, EMULSION INTRAVENOUS at 12:11

## 2024-11-19 RX ADMIN — PROPRANOLOL HYDROCHLORIDE 20 MG: 20 TABLET ORAL at 12:11

## 2024-11-19 RX ADMIN — CEFAZOLIN 2 G: 2 INJECTION, POWDER, FOR SOLUTION INTRAMUSCULAR; INTRAVENOUS at 08:11

## 2024-11-19 NOTE — PROGRESS NOTES
[facility] - [facility] [address] [facility]  Orthopedics  Progress Note    Patient Name: Payam Grande  MRN: [id number]  Admission Date: 11/18/2024  Hospital Length of Stay: 1 days  Attending Provider: Jus Mahoney Jr., *  Primary Care Provider: No primary care provider on file.  Follow-up For: Procedure(s) (LRB):  INSERTION, INTRAMEDULLARY EDWAR, TIBIA (Right)  INCISION AND DRAINAGE, UPPER EXTREMITY (Right)  REMOVAL, HARDWARE (Right)    Post-Operative Day: 1 Day Post-Op  Subjective:     Principal Problem:SDH (subdural hematoma)    Principal Orthopedic Problem: 1 Day Post-Op R tibia IMN, R elbow I&D with structural repairs    Interval History:   Patient is seen this morning in ICU.  Intubated and sedated.  Does not participate in meaningful exam.  Vital signs normal.  No family at bedside.    Review of patient's allergies indicates:  Not on File    Current Facility-Administered Medications   Medication    acetaminophen tablet 650 mg    bisacodyL suppository 10 mg    ceFAZolin 2 g    dexmedetomidine (PRECEDEX) 400mcg/100mL 0.9% NaCL infusion    docusate sodium capsule 100 mg    famotidine (PF) injection 20 mg    fentaNYL 2500 mcg in 0.9% sodium chloride 250 mL infusion premix    hydrALAZINE injection 20 mg    labetaloL injection 20 mg    lactated ringers infusion    levETIRAcetam injection 500 mg    melatonin tablet 6 mg    morphine injection 2 mg    mupirocin 2 % ointment    oxyCODONE immediate release tablet 5 mg    polyethylene glycol packet 17 g    propofol (DIPRIVAN) 10 mg/mL infusion     Objective:     Vital Signs (Most Recent):  Temp: 98.6 °F (37 °C) (11/19/24 0714)  Pulse: (!) 124 (11/19/24 0900)  Resp: (!) 0 (11/19/24 0900)  BP: (!) 117/51 (11/19/24 0900)  SpO2: 96 % (11/19/24 0900) Vital Signs (24h Range):  Temp:  [97.2 °F (36.2 °C)-98.6 °F (37 °C)] 98.6 °F (37 °C)  Pulse:  [] 124  Resp:  [0-26] 0  SpO2:  [96 %-100 %] 96 %  BP: ()/(35-91) 117/51     Weight: 68 kg (149 lb 14.6 oz)  Height: 5'  "8" (172.7 cm)  Body mass index is 22.79 kg/m².      Intake/Output Summary (Last 24 hours) at 11/19/2024 0919  Last data filed at 11/19/2024 0826  Gross per 24 hour   Intake 3751.05 ml   Output 2500 ml   Net 1251.05 ml       Physical Exam:   General the patient is alert and oriented x3 no acute distress nontoxic-appearing appropriate affect.    Constitutional: Vital signs are examined and stable.  Resp: No signs of labored breathing            RUE: -Skin: Dressing CDI.  Hinged brace in place.           -MSK:  Does not participate in meaningful exam           -Neuro:  Does not respond painful stimuli           -Lymphatic: nonpitting edema to surgical site           -CV:Capillary refill is less than 2 seconds. Compartments Soft and compressible                        RLE: -Skin: Dressing CDI.  Cam boot in place.  Leg elevated.           -MSK:  Does not participate in meaningful exam           -Neuro:  Does not respond to painful stimuli           -CV:  Capillary refill less than 2 seconds.   Compartments soft and compressible          Diagnostic Findings:   Significant Labs: CBC:   Recent Labs   Lab 11/18/24  0712 11/19/24  0527   WBC 19.11  19.11* 10.21   HGB 12.3* 8.6*   HCT 36.8* 25.3*   * 285     All pertinent labs within the past 24 hours have been reviewed.    Significant Imaging: I have reviewed all pertinent imaging results/findings.       Assessment/Plan:     Active Diagnoses:    Diagnosis Date Noted POA    PRINCIPAL PROBLEM:  SDH (subdural hematoma) [S06.5XAA] 11/18/2024 Yes    SAH (subarachnoid hemorrhage) [I60.9] 11/18/2024 Yes    Fracture of right tibia [S82.201A] 11/18/2024 Yes    Fracture of right fibula [S82.401A] 11/18/2024 Yes    Laceration of right elbow [S51.011A] 11/18/2024 Yes    Retained orthopedic hardware [Z96.9] 11/18/2024 Not Applicable    Complete tear of lateral collateral ligament of right elbow [S53.431A] 11/18/2024 Yes    Laceration of extensor tendon of right forearm [S56.521A] " 11/18/2024 Yes      Problems Resolved During this Admission:     POD#1   R tibia IMN  HWR R ankle  Repair LCL R elbow with I&D  Repair extensor tendon R forearm    RUE: hinged elbow brace to the right elbow to protect the repair of his collateral ligament.  He can perform full   range of motion.  He will be nonweightbearing except for ADLs to the right upper extremity.      RLE: Cam boot to the right lower extremity and weightbear as tolerated to the right lower extremity.      Lovenox for DVT prophylaxis.    Ancef for 24 hours postop.  Diet as tolerated.    Multimodal pain control.   ABLA noted and expected after procedure.  Monitor.   Staples/sutures out in 2-3 weeks.   F/u with Dr. Campos in clinic in 2 weeks.      The above findings, diagnostics, and treatment plan were discussed with Dr. Campos who is in agreement with the plan of care except as stated in additional documentation.      Hailey Perkins PA-C  Orthopedic Trauma Surgery  [facility]

## 2024-11-19 NOTE — PROGRESS NOTES
Inpatient Nutrition Assessment    Admit Date: 11/18/2024   Total duration of encounter: 1 day   Patient Age: 49 y.o.    Nutrition Recommendation/Prescription     Start tube feeding when appropriate.  Tube feeding recommendation:     Impact Peptide 1.5 goal rate 65 ml/hr to provide  1950 kcal/d  (98% est needs)  122 g protein/d (112% est needs)  1001 ml free water/d (49% est needs)  (calculations based on estimated 20 hr/d run time)     If no IV fluids running, can give 100ml q 2hr water flushes. Total water provided: 2000ml (98% est needs.)     Communication of Recommendations: reviewed with nurse    Nutrition Assessment     Malnutrition Assessment/Nutrition-Focused Physical Exam       Malnutrition Level: other (see comments) (Does not meet criteria) (11/19/24 1232)  Energy Intake (Malnutrition): other (see comments) (Unable to assess) (11/19/24 1232)  Weight Loss (Malnutrition): other (see comments) (Unable to assess) (11/19/24 1232)                                         Fluid Accumulation (Malnutrition): other (see comments) (Not present) (11/19/24 1232)     Hand  Strength, Right (Malnutrition): Unable to assess (11/19/24 1232)  A minimum of two characteristics is recommended for diagnosis of either severe or non-severe malnutrition.    Chart Review    Reason Seen: continuous nutrition monitoring    Malnutrition Screening Tool Results   Have you recently lost weight without trying?: Unsure          Diagnosis:  SAH  Right tibia fracture  Right fibula fracture  Laceration to right elbow  Complete tear of lateral collateral ligament of right elbow  Laceration of extensor tendon of right forearm  SDH    Relevant Medical History: none noted    Scheduled Medications:  acetaminophen, 650 mg, Q4H  amantadine HCL, 100 mg, Daily  ceFAZolin (Ancef) IV (PEDS and ADULTS), 2 g, Q8H  docusate sodium, 100 mg, BID  famotidine (PF), 20 mg, BID  levETIRAcetam (Keppra) IV (PEDS and ADULTS), 500 mg, Q12H  mupirocin, ,  "BID  polyethylene glycol, 17 g, BID  propranoloL, 20 mg, BID    Continuous Infusions:  lactated ringers, Last Rate: 100 mL/hr at 11/18/24 0904    PRN Medications:  bisacodyL, 10 mg, Daily PRN  hydrALAZINE, 20 mg, Q4H PRN  labetalol, 20 mg, Q4H PRN  melatonin, 6 mg, Nightly PRN  morphine, 4 mg, Q2H PRN  oxyCODONE, 5 mg, Q4H PRN    Calorie Containing IV Medications: no significant kcals from medications at this time    Recent Labs   Lab 11/18/24  0712 11/19/24  0357 11/19/24  0527    137  --    K 3.8 3.3*  --    CALCIUM 9.1 8.1*  --     108*  --    CO2 27 23  --    BUN 14.8 13.1  --    CREATININE 0.81 0.71*  --    EGFRNORACEVR >60 >60  --    GLUCOSE 127* 112*  --    BILITOT 0.4 0.3  --    ALKPHOS 60 40  --    ALT 23 17  --    AST 34 30  --    ALBUMIN 3.9 2.5*  --    WBC 19.11  19.11*  --  10.21   HGB 12.3*  --  8.6*   HCT 36.8*  --  25.3*     Nutrition Orders:  Diet NPO      Appetite/Oral Intake: NPO/NPO  Factors Affecting Nutritional Intake: NPO  Social Needs Impacting Access to Food: unable to assess at this time; will attempt on follow-up  Food/Anabaptism/Cultural Preferences: unable to obtain  Food Allergies: unable to obtain  Last Bowel Movement: 11/17/24  Wound(s):     Wound 11/19/24 1141 Abrasion(s) Left lateral;upper Back-Tissue loss description: Partial thickness     Comments    11/19/24: Pt recently extubated. Discussed with RN, plans for NG placement. Will provide TF recommendations in case needed. Pt on able to verify subjective info at time of RD visit due to confusion.    Anthropometrics    Height: 5' 7.99" (172.7 cm), Height Method: Estimated  Last Weight: 68 kg (149 lb 14.6 oz) (11/18/24 0900), Weight Method: Bed Scale  BMI (Calculated): 22.8  BMI Classification: normal (BMI 18.5-24.9)        Ideal Body Weight (IBW), Male: 153.94 lb     % Ideal Body Weight, Male (lb): 97.34 %                          Usual Weight Provided By: unable to obtain usual weight    Wt Readings from Last 5 " Encounters:   11/18/24 68 kg (149 lb 14.6 oz)     Weight Change(s) Since Admission:   Wt Readings from Last 1 Encounters:   11/18/24 0900 68 kg (149 lb 14.6 oz)   11/18/24 0705 68 kg (150 lb)   Admit Weight: 68 kg (150 lb) (11/18/24 0705), Weight Method: Estimated    Estimated Needs    Weight Used For Calorie Calculations: 68 kg (149 lb 14.6 oz)  Energy Calorie Requirements (kcal): 1981kcal (1.3 stress factor)  Energy Need Method: [place]-St Jeor  Weight Used For Protein Calculations: 68 kg (149 lb 14.6 oz)  Protein Requirements: 95-109gm (1.4-1.6g/kg)  Fluid Requirements (mL): 2040ml (30ml/kg)  CHO Requirement: 225gm (45% est kcal needs)     Enteral Nutrition     Patient not receiving enteral nutrition at this time.    Parenteral Nutrition     Patient not receiving parenteral nutrition support at this time.    Evaluation of Received Nutrient Intake    Calories: not meeting estimated needs  Protein: not meeting estimated needs    Patient Education     Not applicable.    Nutrition Diagnosis     PES: Inadequate oral intake related to acute illness as evidenced by NPO since admit (recently extubated). (new)     PES:            Nutrition Interventions     Intervention(s): modified composition of enteral nutrition, modified rate of enteral nutrition, and collaboration with other providers  Intervention(s): Enteral nutrition    Goal: Meet greater than 80% of nutritional needs by follow-up. (new)  Goal: Tolerate enteral feeding at goal rate by follow-up. (new)    Nutrition Goals & Monitoring     Dietitian will monitor: energy intake and enteral nutrition intake  Discharge planning: too early to determine; pending clinical course  Nutrition Risk/Follow-Up: moderate (follow-up in 3-5 days)   Please consult if re-assessment needed sooner.

## 2024-11-19 NOTE — PROGRESS NOTES
[facility] - [address] [place] [facility]  Neurosurgery  Progress Note    Subjective:     Interval History: He was extubated this am. He does open eyes to voice but speaks incomprehensible words. He is moving left UE and bilateral LE spontaneously but does not follow commands. Still with very limited ROM right UE, currently in brace.  POD#1 IM edwar right tibia, repair ligament right elbow    Post-Op Info:  Procedure(s) (LRB):  INSERTION, INTRAMEDULLARY EDWAR, TIBIA (Right)  INCISION AND DRAINAGE, UPPER EXTREMITY (Right)  REMOVAL, HARDWARE (Right)   1 Day Post-Op      Medications:  Continuous Infusions:   dexmedeTOMIDine (Precedex) infusion (titrating)  0-1.4 mcg/kg/hr Intravenous Continuous   Stopped at 11/18/24 2111    fentanyl  0-250 mcg/hr Intravenous Continuous   Stopped at 11/19/24 0730    lactated ringers   Intravenous Continuous 100 mL/hr at 11/18/24 0904 New Bag at 11/18/24 0904    propofoL  0-50 mcg/kg/min Intravenous Continuous   Stopped at 11/19/24 0730     Scheduled Meds:   acetaminophen  650 mg Oral Q4H    ceFAZolin (Ancef) IV (PEDS and ADULTS)  2 g Intravenous Q8H    docusate sodium  100 mg Oral BID    famotidine (PF)  20 mg Intravenous BID    levETIRAcetam (Keppra) IV (PEDS and ADULTS)  500 mg Intravenous Q12H    mupirocin   Nasal BID    polyethylene glycol  17 g Oral BID     PRN Meds:  Current Facility-Administered Medications:     bisacodyL, 10 mg, Rectal, Daily PRN    hydrALAZINE, 20 mg, Intravenous, Q4H PRN    labetalol, 20 mg, Intravenous, Q4H PRN    melatonin, 6 mg, Oral, Nightly PRN    morphine, 2 mg, Intravenous, Q2H PRN    oxyCODONE, 5 mg, Oral, Q4H PRN     Review of Systems  Objective:     Weight: 68 kg (149 lb 14.6 oz)  Body mass index is 22.79 kg/m².  Vital Signs (Most Recent):  Temp: 98.6 °F (37 °C) (11/19/24 0714)  Pulse: (!) 120 (11/19/24 1000)  Resp: 12 (11/19/24 1000)  BP: 129/61 (11/19/24 1000)  SpO2: 97 % (11/19/24 1000) Vital Signs (24h Range):  Temp:  [97.2 °F (36.2 °C)-98.6 °F (37  "°C)] 98.6 °F (37 °C)  Pulse:  [] 120  Resp:  [0-24] 12  SpO2:  [96 %-100 %] 97 %  BP: ()/(35-91) 129/61     Date 11/19/24 0700 - 11/20/24 0659   Shift 2623-7126 8161-9082 1879-2338 24 Hour Total   INTAKE   I.V.(mL/kg) 52.4(0.8)   52.4(0.8)   Shift Total(mL/kg) 52.4(0.8)   52.4(0.8)   OUTPUT   Urine(mL/kg/hr) 250   250   Drains 300   300   Shift Total(mL/kg) 550(8.1)   550(8.1)   Weight (kg) 68 68 68 68              Vent Mode: CPAP / PSV  Oxygen Concentration (%):  [21-40] 21  Resp Rate Total:  [15 br/min-20 br/min] 15 br/min  Vt Set:  [500 mL] 500 mL  PEEP/CPAP:  [5 cmH20] 5 cmH20  Pressure Support:  [10 cmH20] 10 cmH20  Mean Airway Pressure:  [7 cmH20-10 cmH20] 7 cmH20             NG/OG Tube 11/19/24 1027 16 Fr. Left nostril (Active)   Placement Check placement verified by aspirate characteristics;placement verified by distal tube length measurement 11/19/24 1000   Tube advanced (cm) 56 11/19/24 1000   Advancement advanced manually 11/19/24 1000   Tolerance no signs/symptoms of discomfort 11/19/24 1000   Securement secured to commercial device 11/19/24 1000   Clamp Status/Tolerance clamped;no abdominal discomfort;no abdominal distention;no nausea;no emesis;no residual;no restlessness 11/19/24 1000   Suction Setting/Drainage Method suction at the bedside 11/19/24 1000   Insertion Site Appearance no redness, warmth, tenderness, skin breakdown, drainage 11/19/24 1000            Urethral Catheter 11/18/24 0713 (Active)   $ Jose Insertion Bedside Insertion Performed 11/18/24 0745   Site Assessment Clean;Intact;Dry 11/19/24 0714   Collection Container Urimeter 11/19/24 0714   Securement Method secured to top of thigh w/ adhesive device 11/19/24 0714   Catheter Care Performed yes 11/19/24 0714   Reason for Continuing Urinary Catheterization Critically ill in ICU and requiring hourly monitoring of intake/output 11/19/24 0714   CAUTI Prevention Bundle Securement Device in place with 1" slack;Intact seal between " catheter & drainage tubing;Drainage bag/urimeter off the floor;Sheeting clip in use;No dependent loops or kinks;Drainage bag/urimeter not overfilled (<2/3 full);Drainage bag/urimeter below bladder 11/19/24 0714   Output (mL) 250 mL 11/19/24 1000       Neurosurgery Physical Exam  AFVSS  PERRL, EOMI  Opens eyes to voice. Inappropriate words. Spontaneously moving left UE, bilateral LE but does not follow commands. Minimal movement in right UE, currently in brace.  Right LE in splint    Significant Labs:  Recent Labs   Lab 11/18/24  0712 11/19/24  0357    137   K 3.8 3.3*    108*   CO2 27 23   BUN 14.8 13.1   CREATININE 0.81 0.71*   CALCIUM 9.1 8.1*     Recent Labs   Lab 11/18/24  0712 11/19/24  0527   WBC 19.11  19.11* 10.21   HGB 12.3* 8.6*   HCT 36.8* 25.3*   * 285     Recent Labs   Lab 11/18/24  0712   INR 1.0   APTT 24.9     Microbiology Results (last 7 days)       ** No results found for the last 168 hours. **          Significant Diagnostics:  CTA Head and Neck (xpd) [[id number]] Resulted: 11/18/24 1309   Order Status: Completed Updated: 11/18/24 1311   Narrative:     EXAMINATION:  CTA HEAD AND NECK (XPD)    CLINICAL HISTORY:  Neuro deficit, acute, stroke suspected;    TECHNIQUE:  Axial images obtained through the cervical region and [place] before and after the administration of intravenous contrast.    Coronal, sagittal, MIP and 3D reconstructions were obtained from the axial data.    Automatic exposure control was utilized to limit radiation dose.    Radiation Dose:    Total DLP: 1759 mGy*cm    COMPARISON:  CT head from the same day    FINDINGS:  Head CT with contrast:    No interval changes when compared to the previous CT.    No enhancing abnormalities.    If present, stenosis of the carotid bulbs is measured based on NASCET criteria,    i.e. area of maximal stenosis compared to the cervical ICA distal to the bulb.    Cervical CTA:    The origins of the great vessels are  patent.    The common carotid arteries, carotid bulbs and internal carotid arteries are patent.  There are mild atherosclerotic changes at the left carotid bulb without significant stenosis.    The vertebral arteries are patent.    Intracranial CTA:    The internal carotid arteries, middle cerebral arteries and anterior cerebral arteries are patent.    The vertebral arteries, basilar artery and posterior cerebral arteries are patent.    The dural venous sinuses are patent.   Impression:       No evidence of acute arterial injury.  No large vessel occlusion or flow-limiting stenosis.      Electronically signed by: Rosa Elena Suh  Date: 11/18/2024  Time: 13:09   CT Head Without Contrast [[id number]] Resulted: 11/18/24 1301   Order Status: Completed Updated: 11/18/24 1303   Narrative:     EXAMINATION:  CT HEAD WITHOUT CONTRAST    CLINICAL HISTORY:  Subdural hemorrhage;    TECHNIQUE:  Axial scans were obtained from skull base to the vertex.    Coronal and sagittal reconstructions obtained from the axial data.    Automatic exposure control was utilized to limit radiation dose.    Contrast: None    Radiation Dose:    Total DLP: 1125 mGy*cm    COMPARISON:  CT head dated 11/18/2024    FINDINGS:  There is a stable small parenchymal hemorrhage in the inferior aspect of the left cerebellum (series 2, image 17).  Small subdural hemorrhages along the left cerebral convexity, falx and tentorium remains stable.  There is a small amount of scattered subarachnoid hemorrhage, not greatly changed.  There is trace layering intraventricular hemorrhage.  The brain parenchyma is unchanged with scattered areas of encephalomalacia bilaterally.    There is no significant mass effect or midline shift.  The basal cisterns are patent.  The ventricles are stable in size.  There are postoperative changes of the left calvarium.  The skull base appears intact.   Impression:       No significant change in small scattered intracranial hemorrhages.      Assessment/Plan:     Active Diagnoses:    Diagnosis Date Noted POA    PRINCIPAL PROBLEM:  SDH (subdural hematoma) [S06.5XAA] 11/18/2024 Yes    SAH (subarachnoid hemorrhage) [I60.9] 11/18/2024 Yes    Fracture of right tibia [S82.201A] 11/18/2024 Yes    Fracture of right fibula [S82.401A] 11/18/2024 Yes    Laceration of right elbow [S51.011A] 11/18/2024 Yes    Retained orthopedic hardware [Z96.9] 11/18/2024 Not Applicable    Complete tear of lateral collateral ligament of right elbow [S53.431A] 11/18/2024 Yes    Laceration of extensor tendon of right forearm [S56.521A] 11/18/2024 Yes      Problems Resolved During this Admission:     He has been extubated and is more alert today. Still not following commands.  CT head repeated yesterday was stable  No plans for surgical intervention  OK for Q2 hour neuro exams  BP parameters below 140/90  Keppra BID  OK for PT/OT  SCDs for DVT prophylaxis    AMANDEEP Nur  Neurosurgery  [facility] - [address] [facility]
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
## 2024-11-19 NOTE — PLAN OF CARE
Extubated but not making sense per dr notes moving left UE and bilateral LE spontaneously but does not follow commands. Still with very limited ROM right UE, currently in brace.
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
## 2024-11-20 LAB
ALBUMIN SERPL-MCNC: 3.2 G/DL (ref 3.5–5)
ALBUMIN/GLOB SERPL: 1.1 RATIO (ref 1.1–2)
ALP SERPL-CCNC: 58 UNIT/L (ref 40–150)
ALT SERPL-CCNC: 116 UNIT/L (ref 0–55)
ANION GAP SERPL CALC-SCNC: 10 MEQ/L
AST SERPL-CCNC: 454 UNIT/L (ref 5–34)
BASOPHILS # BLD AUTO: 0.05 X10(3)/MCL
BASOPHILS NFR BLD AUTO: 0.3 %
BILIRUB SERPL-MCNC: 0.5 MG/DL
BUN SERPL-MCNC: 11 MG/DL (ref 8.9–20.6)
CALCIUM SERPL-MCNC: 8.6 MG/DL (ref 8.4–10.2)
CHLORIDE SERPL-SCNC: 103 MMOL/L (ref 98–107)
CO2 SERPL-SCNC: 22 MMOL/L (ref 22–29)
CREAT SERPL-MCNC: 0.62 MG/DL (ref 0.72–1.25)
CREAT/UREA NIT SERPL: 18
EOSINOPHIL # BLD AUTO: 0.05 X10(3)/MCL (ref 0–0.9)
EOSINOPHIL NFR BLD AUTO: 0.3 %
ERYTHROCYTE [DISTWIDTH] IN BLOOD BY AUTOMATED COUNT: 13.4 % (ref 11.5–17)
GFR SERPLBLD CREATININE-BSD FMLA CKD-EPI: >60 ML/MIN/1.73/M2
GLOBULIN SER-MCNC: 3 GM/DL (ref 2.4–3.5)
GLUCOSE SERPL-MCNC: 80 MG/DL (ref 74–100)
HCT VFR BLD AUTO: 29.9 % (ref 42–52)
HGB BLD-MCNC: 9.8 G/DL (ref 14–18)
IMM GRANULOCYTES # BLD AUTO: 0.06 X10(3)/MCL (ref 0–0.04)
IMM GRANULOCYTES NFR BLD AUTO: 0.4 %
LYMPHOCYTES # BLD AUTO: 1.52 X10(3)/MCL (ref 0.6–4.6)
LYMPHOCYTES NFR BLD AUTO: 9.5 %
MCH RBC QN AUTO: 30.4 PG (ref 27–31)
MCHC RBC AUTO-ENTMCNC: 32.8 G/DL (ref 33–36)
MCV RBC AUTO: 92.9 FL (ref 80–94)
MONOCYTES # BLD AUTO: 1.68 X10(3)/MCL (ref 0.1–1.3)
MONOCYTES NFR BLD AUTO: 10.5 %
NEUTROPHILS # BLD AUTO: 12.69 X10(3)/MCL (ref 2.1–9.2)
NEUTROPHILS NFR BLD AUTO: 79 %
NRBC BLD AUTO-RTO: 0 %
PLATELET # BLD AUTO: 345 X10(3)/MCL (ref 130–400)
PMV BLD AUTO: 10.3 FL (ref 7.4–10.4)
POTASSIUM SERPL-SCNC: 3.6 MMOL/L (ref 3.5–5.1)
PROT SERPL-MCNC: 6.2 GM/DL (ref 6.4–8.3)
RBC # BLD AUTO: 3.22 X10(6)/MCL (ref 4.7–6.1)
SODIUM SERPL-SCNC: 135 MMOL/L (ref 136–145)
WBC # BLD AUTO: 16.05 X10(3)/MCL (ref 4.5–11.5)

## 2024-11-20 PROCEDURE — 97167 OT EVAL HIGH COMPLEX 60 MIN: CPT

## 2024-11-20 PROCEDURE — 99291 CRITICAL CARE FIRST HOUR: CPT | Mod: ,,, | Performed by: SURGERY

## 2024-11-20 PROCEDURE — 63600175 PHARM REV CODE 636 W HCPCS

## 2024-11-20 PROCEDURE — 25000003 PHARM REV CODE 250: Performed by: SURGERY

## 2024-11-20 PROCEDURE — 85025 COMPLETE CBC W/AUTO DIFF WBC: CPT

## 2024-11-20 PROCEDURE — 99900035 HC TECH TIME PER 15 MIN (STAT)

## 2024-11-20 PROCEDURE — 11000001 HC ACUTE MED/SURG PRIVATE ROOM

## 2024-11-20 PROCEDURE — 63600175 PHARM REV CODE 636 W HCPCS: Performed by: NURSE PRACTITIONER

## 2024-11-20 PROCEDURE — 94799 UNLISTED PULMONARY SVC/PX: CPT

## 2024-11-20 PROCEDURE — 97163 PT EVAL HIGH COMPLEX 45 MIN: CPT

## 2024-11-20 PROCEDURE — 97535 SELF CARE MNGMENT TRAINING: CPT

## 2024-11-20 PROCEDURE — 92610 EVALUATE SWALLOWING FUNCTION: CPT

## 2024-11-20 PROCEDURE — 99232 SBSQ HOSP IP/OBS MODERATE 35: CPT | Mod: FS,,, | Performed by: STUDENT IN AN ORGANIZED HEALTH CARE EDUCATION/TRAINING PROGRAM

## 2024-11-20 PROCEDURE — 25000003 PHARM REV CODE 250: Performed by: NURSE PRACTITIONER

## 2024-11-20 PROCEDURE — 63600175 PHARM REV CODE 636 W HCPCS: Performed by: SURGERY

## 2024-11-20 PROCEDURE — 36415 COLL VENOUS BLD VENIPUNCTURE: CPT

## 2024-11-20 PROCEDURE — 25000003 PHARM REV CODE 250

## 2024-11-20 PROCEDURE — 80053 COMPREHEN METABOLIC PANEL: CPT

## 2024-11-20 RX ORDER — POLYETHYLENE GLYCOL 3350 17 G/17G
17 POWDER, FOR SOLUTION ORAL 2 TIMES DAILY
Status: DISCONTINUED | OUTPATIENT
Start: 2024-11-20 | End: 2024-12-03

## 2024-11-20 RX ORDER — ENOXAPARIN SODIUM 100 MG/ML
40 INJECTION SUBCUTANEOUS EVERY 12 HOURS
Status: DISCONTINUED | OUTPATIENT
Start: 2024-11-20 | End: 2024-12-05 | Stop reason: HOSPADM

## 2024-11-20 RX ORDER — MODAFINIL 100 MG/1
100 TABLET ORAL DAILY
Status: DISCONTINUED | OUTPATIENT
Start: 2024-11-20 | End: 2024-11-26

## 2024-11-20 RX ORDER — MORPHINE SULFATE 4 MG/ML
4 INJECTION, SOLUTION INTRAMUSCULAR; INTRAVENOUS EVERY 4 HOURS PRN
Status: DISPENSED | OUTPATIENT
Start: 2024-11-20 | End: 2024-11-21

## 2024-11-20 RX ORDER — DOCUSATE SODIUM 50 MG/5ML
100 LIQUID ORAL 2 TIMES DAILY
Status: DISCONTINUED | OUTPATIENT
Start: 2024-11-20 | End: 2024-11-25

## 2024-11-20 RX ADMIN — PROPRANOLOL HYDROCHLORIDE 20 MG: 20 TABLET ORAL at 08:11

## 2024-11-20 RX ADMIN — FAMOTIDINE 20 MG: 10 INJECTION, SOLUTION INTRAVENOUS at 08:11

## 2024-11-20 RX ADMIN — CEFAZOLIN 2 G: 2 INJECTION, POWDER, FOR SOLUTION INTRAMUSCULAR; INTRAVENOUS at 12:11

## 2024-11-20 RX ADMIN — ACETAMINOPHEN 650 MG: 325 TABLET, FILM COATED ORAL at 09:11

## 2024-11-20 RX ADMIN — CEFAZOLIN 2 G: 2 INJECTION, POWDER, FOR SOLUTION INTRAMUSCULAR; INTRAVENOUS at 11:11

## 2024-11-20 RX ADMIN — AMANTADINE HYDROCHLORIDE 100 MG: 50 SOLUTION ORAL at 08:11

## 2024-11-20 RX ADMIN — ACETAMINOPHEN 650 MG: 325 TABLET, FILM COATED ORAL at 05:11

## 2024-11-20 RX ADMIN — POLYETHYLENE GLYCOL 3350 17 G: 17 POWDER, FOR SOLUTION ORAL at 08:11

## 2024-11-20 RX ADMIN — MUPIROCIN: 20 OINTMENT TOPICAL at 08:11

## 2024-11-20 RX ADMIN — LEVETIRACETAM 500 MG: 100 INJECTION, SOLUTION INTRAVENOUS at 08:11

## 2024-11-20 RX ADMIN — CEFAZOLIN 2 G: 2 INJECTION, POWDER, FOR SOLUTION INTRAMUSCULAR; INTRAVENOUS at 08:11

## 2024-11-20 RX ADMIN — ENOXAPARIN SODIUM 40 MG: 40 INJECTION SUBCUTANEOUS at 08:11

## 2024-11-20 RX ADMIN — DOCUSATE SODIUM LIQUID 100 MG: 100 LIQUID ORAL at 08:11

## 2024-11-20 RX ADMIN — MODAFINIL 100 MG: 100 TABLET ORAL at 09:11

## 2024-11-20 RX ADMIN — CEFAZOLIN 2 G: 2 INJECTION, POWDER, FOR SOLUTION INTRAMUSCULAR; INTRAVENOUS at 04:11

## 2024-11-20 RX ADMIN — MORPHINE SULFATE 4 MG: 4 INJECTION INTRAVENOUS at 07:11

## 2024-11-20 RX ADMIN — HYDRALAZINE HYDROCHLORIDE 20 MG: 20 INJECTION INTRAMUSCULAR; INTRAVENOUS at 04:11

## 2024-11-20 NOTE — PROGRESS NOTES
[facility]                       [address] [place] [address]    PATIENT NAME:       IZA HURST  YOB: 1975  CSN:                [id number]   MRN:                [id number]  ADMIT DATE:         11/18/2024 06:51:00  PHYSICIAN:          Alee Chaves MD                            PROGRESS NOTE    DATE:  11/20/2024 00:00:00    SUBJECTIVE:  The patient is doing much better.  He has been extubated.  His   mental status is greatly improved.  However, he does have diffuse axonal injury.    So, his mental status is decreased.  He is not agitated, but he does seem to   have trouble understanding.    OBJECTIVE:  VITAL SIGNS:  Temperature 98.4, heart rate 80, respiratory rate 20,   and blood pressure 132/76.  GENERAL:  The patient is lying in bed.  He is restrained.  He does respond to   sounds, but he is not able to answer questions or follow request.  HEAD:  The patient has bruising and scratches on his head.  EYES:  Extraocular movements intact.  NOSE:  The patient has an NG tube on the left with a bridle coming out of the   right side.  ORAL CAVITY/OROPHARYNX:  Poor dentition.  NECK:  No adenopathy.  FACE:  It is difficult to get him to move his face.  I did not see any movement   of the upper division of his left facial nerve.  However, he has good tone on   that side.  He did move his mouth and the right side of the mouth moved, but the   left side of the mouth seemed to not move.  The remainder of his cranial nerve   exam is very difficult to perform due to the diffuse axonal injury.  EXTREMITIES:  The patient is in restraints.  He has gloves over both arms.  He   has orthopedic hardware on his right arm and right leg.    IMPRESSION:    1. Left temporal bone fracture with left facial paresis.  2. He does appear to have good tone on that side.  3. We will follow him.  4. Hopefully, he will have recovery of his  facial nerve function.        ______________________________  Alee Chaves MD    TEM/AQS  DD:  11/20/2024  Time:  02:28AM  DT:  11/20/2024  Time:  05:34AM  Job #:  [id number]    cc:   . Room ICU Bed 33        Alee Chaves MD        PROGRESS NOTE
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
## 2024-11-20 NOTE — PROGRESS NOTES
[facility] ICU  Wound Care    Patient Name:  Payam Grande   MRN:  [id number]  Date: 11/20/2024  Diagnosis: SDH (subdural hematoma)    History:     No past medical history on file.    Social History     Socioeconomic History    Marital status: Unknown     Social Drivers of Health     Financial Resource Strain: Patient Unable To Answer (11/18/2024)    Overall Financial Resource Strain (CARDIA)     Difficulty of Paying Living Expenses: Patient unable to answer   Food Insecurity: Patient Unable To Answer (11/18/2024)    Hunger Vital Sign     Worried About Running Out of Food in the Last Year: Patient unable to answer     Ran Out of Food in the Last Year: Patient unable to answer   Transportation Needs: Patient Unable To Answer (11/18/2024)    TRANSPORTATION NEEDS     Transportation : Patient unable to answer   Physical Activity: Patient Unable To Answer (11/18/2024)    Exercise Vital Sign     Days of Exercise per Week: Patient unable to answer     Minutes of Exercise per Session: Patient unable to answer   Stress: Patient Unable To Answer (11/18/2024)    [employer] - Occupational Stress Questionnaire     Feeling of Stress : Patient unable to answer   Housing Stability: Patient Unable To Answer (11/18/2024)    Housing Stability Vital Sign     Unable to Pay for Housing in the Last Year: Patient unable to answer     Homeless in the Last Year: Patient unable to answer       Precautions:     Allergies as of 11/18/2024    (Not on File)       [facility] Assessment Details/Treatment      11/19/24 1332   WOCN Assessment   Visit Date 11/19/24   Visit Time 1332   Consult Type New   [facility] Speciality Wound   Intervention chart review;assessed;applied   Teaching on-going        Wound 11/19/24 1141 Abrasion(s) Left lateral;upper Back   Date First Assessed/Time First Assessed: 11/19/24 1141   Present on Original Admission: Yes  Primary Wound Type: Abrasion(s)  Side: Left  Orientation:  lateral;upper  Location: Back   Wound Image    Dressing Appearance Open to air   Drainage Amount None   Drainage Characteristics/Odor No odor   Appearance Pink;Red;Moist   Tissue loss description Partial thickness   Black (%), Wound Tissue Color 0 %   Red (%), Wound Tissue Color 100 %   Yellow (%), Wound Tissue Color 0 %   Periwound Area Intact;Dry   Wound Edges Defined   Wound Length (cm) 2.8 cm   Wound Width (cm) 4 cm   Wound Depth (cm) 0.1 cm   Wound Volume (cm^3) 1.12 cm^3   Wound Surface Area (cm^2) 11.2 cm^2   Care Cleansed with:;Soap and water   Dressing Applied;Non-adherent;Gauze     WOCN consulted for left shoulder. Discussed plan of care with nurse Rao prior to visiting the patient. Introduced self and explained reason for visit. Treatment recommendations to be put in place. Left shoulder: Cleanse with soap and water, dry well. Apply small adaptic to area, cover with thin layer of dry gauze and secure with tape daily until area is dry. Patient on MAYRA mattress. Educated staff on the importance of maintaining good hygiene regimen, he verbalized understanding. Patient on ICU MAYRA mattress but mobilizes self. Nursing to continue with treatment recommendations and other preventative measures. Will follow up.
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
## 2024-11-20 NOTE — PLAN OF CARE
Problem: Occupational Therapy  Goal: Occupational Therapy Goal  Description: LTG: Pt will perform basic ADLs and ADL transfers with Modified independence using LRAD by discharge.    STG: to be met by 12/20/24    Pt will complete grooming standing at sink with LRAD with SBA.  Pt will complete UB dressing with SBA.  Pt will complete LB dressing with SBA using LRAD.  Pt will complete toileting with SBA using LRAD.  Pt will complete functional mobility to/from toilet and toilet transfer with SBA using LRAD.    Outcome: Progressing
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
## 2024-11-20 NOTE — PLAN OF CARE
Problem: Physical Therapy  Goal: Physical Therapy Goal  Description: Goals to be met by: 24     Patient will increase functional independence with mobility by performin. Supine to sit with Modified [place]  2. Sit to stand transfer with Contact Guard Assistance  3. Bed to chair transfer with Contact Guard Assistance using LRAD  4. Gait  x 150 feet with Contact Guard Assistance using LRAD.     Outcome: Progressing
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
## 2024-11-21 LAB
ALBUMIN SERPL-MCNC: 2.7 G/DL (ref 3.5–5)
ALBUMIN/GLOB SERPL: 0.7 RATIO (ref 1.1–2)
ALP SERPL-CCNC: 66 UNIT/L (ref 40–150)
ALT SERPL-CCNC: 155 UNIT/L (ref 0–55)
ANION GAP SERPL CALC-SCNC: 10 MEQ/L
AST SERPL-CCNC: 598 UNIT/L (ref 5–34)
BASOPHILS # BLD AUTO: 0.03 X10(3)/MCL
BASOPHILS NFR BLD AUTO: 0.2 %
BILIRUB SERPL-MCNC: 0.5 MG/DL
BUN SERPL-MCNC: 11.4 MG/DL (ref 8.9–20.6)
CALCIUM SERPL-MCNC: 9.4 MG/DL (ref 8.4–10.2)
CHLORIDE SERPL-SCNC: 104 MMOL/L (ref 98–107)
CO2 SERPL-SCNC: 22 MMOL/L (ref 22–29)
CREAT SERPL-MCNC: 0.64 MG/DL (ref 0.72–1.25)
CREAT/UREA NIT SERPL: 18
CRP SERPL-MCNC: 214.1 MG/L
EOSINOPHIL # BLD AUTO: 0.02 X10(3)/MCL (ref 0–0.9)
EOSINOPHIL NFR BLD AUTO: 0.1 %
ERYTHROCYTE [DISTWIDTH] IN BLOOD BY AUTOMATED COUNT: 13.2 % (ref 11.5–17)
GFR SERPLBLD CREATININE-BSD FMLA CKD-EPI: >60 ML/MIN/1.73/M2
GLOBULIN SER-MCNC: 3.8 GM/DL (ref 2.4–3.5)
GLUCOSE SERPL-MCNC: 72 MG/DL (ref 74–100)
HCT VFR BLD AUTO: 27.3 % (ref 42–52)
HGB BLD-MCNC: 9.3 G/DL (ref 14–18)
IMM GRANULOCYTES # BLD AUTO: 0.08 X10(3)/MCL (ref 0–0.04)
IMM GRANULOCYTES NFR BLD AUTO: 0.5 %
LYMPHOCYTES # BLD AUTO: 1.64 X10(3)/MCL (ref 0.6–4.6)
LYMPHOCYTES NFR BLD AUTO: 11 %
MCH RBC QN AUTO: 30.8 PG (ref 27–31)
MCHC RBC AUTO-ENTMCNC: 34.1 G/DL (ref 33–36)
MCV RBC AUTO: 90.4 FL (ref 80–94)
MONOCYTES # BLD AUTO: 1.63 X10(3)/MCL (ref 0.1–1.3)
MONOCYTES NFR BLD AUTO: 10.9 %
NEUTROPHILS # BLD AUTO: 11.49 X10(3)/MCL (ref 2.1–9.2)
NEUTROPHILS NFR BLD AUTO: 77.3 %
NRBC BLD AUTO-RTO: 0 %
PLATELET # BLD AUTO: 240 X10(3)/MCL (ref 130–400)
PMV BLD AUTO: 11.4 FL (ref 7.4–10.4)
POTASSIUM SERPL-SCNC: 3.7 MMOL/L (ref 3.5–5.1)
PREALB SERPL-MCNC: 11.6 MG/DL (ref 18–45)
PROT SERPL-MCNC: 6.5 GM/DL (ref 6.4–8.3)
RBC # BLD AUTO: 3.02 X10(6)/MCL (ref 4.7–6.1)
SODIUM SERPL-SCNC: 136 MMOL/L (ref 136–145)
WBC # BLD AUTO: 14.89 X10(3)/MCL (ref 4.5–11.5)

## 2024-11-21 PROCEDURE — 25000003 PHARM REV CODE 250: Performed by: SURGERY

## 2024-11-21 PROCEDURE — 11000001 HC ACUTE MED/SURG PRIVATE ROOM

## 2024-11-21 PROCEDURE — 86140 C-REACTIVE PROTEIN: CPT

## 2024-11-21 PROCEDURE — 80053 COMPREHEN METABOLIC PANEL: CPT

## 2024-11-21 PROCEDURE — 97530 THERAPEUTIC ACTIVITIES: CPT | Mod: CQ

## 2024-11-21 PROCEDURE — 25000003 PHARM REV CODE 250

## 2024-11-21 PROCEDURE — 63600175 PHARM REV CODE 636 W HCPCS

## 2024-11-21 PROCEDURE — 63600175 PHARM REV CODE 636 W HCPCS: Performed by: SURGERY

## 2024-11-21 PROCEDURE — 85025 COMPLETE CBC W/AUTO DIFF WBC: CPT

## 2024-11-21 PROCEDURE — 97535 SELF CARE MNGMENT TRAINING: CPT

## 2024-11-21 PROCEDURE — 25000003 PHARM REV CODE 250: Performed by: NURSE PRACTITIONER

## 2024-11-21 PROCEDURE — 99900035 HC TECH TIME PER 15 MIN (STAT)

## 2024-11-21 PROCEDURE — 36415 COLL VENOUS BLD VENIPUNCTURE: CPT

## 2024-11-21 PROCEDURE — 84134 ASSAY OF PREALBUMIN: CPT

## 2024-11-21 RX ADMIN — MUPIROCIN: 20 OINTMENT TOPICAL at 08:11

## 2024-11-21 RX ADMIN — LEVETIRACETAM 500 MG: 100 INJECTION, SOLUTION INTRAVENOUS at 08:11

## 2024-11-21 RX ADMIN — PROPRANOLOL HYDROCHLORIDE 20 MG: 20 TABLET ORAL at 08:11

## 2024-11-21 RX ADMIN — ACETAMINOPHEN 650 MG: 325 TABLET, FILM COATED ORAL at 01:11

## 2024-11-21 RX ADMIN — ACETAMINOPHEN 650 MG: 325 TABLET, FILM COATED ORAL at 05:11

## 2024-11-21 RX ADMIN — DOCUSATE SODIUM LIQUID 100 MG: 100 LIQUID ORAL at 08:11

## 2024-11-21 RX ADMIN — ACETAMINOPHEN 325 MG: 325 TABLET, FILM COATED ORAL at 09:11

## 2024-11-21 RX ADMIN — CEFAZOLIN 2 G: 2 INJECTION, POWDER, FOR SOLUTION INTRAMUSCULAR; INTRAVENOUS at 04:11

## 2024-11-21 RX ADMIN — ACETAMINOPHEN 650 MG: 325 TABLET, FILM COATED ORAL at 10:11

## 2024-11-21 RX ADMIN — POLYETHYLENE GLYCOL 3350 17 G: 17 POWDER, FOR SOLUTION ORAL at 08:11

## 2024-11-21 RX ADMIN — CEFAZOLIN 2 G: 2 INJECTION, POWDER, FOR SOLUTION INTRAMUSCULAR; INTRAVENOUS at 08:11

## 2024-11-21 RX ADMIN — AMANTADINE HYDROCHLORIDE 100 MG: 50 SOLUTION ORAL at 08:11

## 2024-11-21 RX ADMIN — FAMOTIDINE 20 MG: 10 INJECTION, SOLUTION INTRAVENOUS at 08:11

## 2024-11-21 RX ADMIN — MODAFINIL 100 MG: 100 TABLET ORAL at 08:11

## 2024-11-21 RX ADMIN — ENOXAPARIN SODIUM 40 MG: 40 INJECTION SUBCUTANEOUS at 08:11
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
## 2024-11-21 NOTE — PLAN OF CARE
[facility] Records indicate pt lives at [address] [address] PCP Toshia Velasquez and he has [employer] Medicare  HMO [id number]  Email sent to notice of admission office and financial counselors as pt is listed as self pay  Left message for Sloane German  to call me, text also sent with my name and number  Trauma rounding info indicates pt will have a swallow eval and will be with us for a bit.
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
## 2024-11-21 NOTE — PROGRESS NOTES
[facility] ICU  Wound Care    Patient Name:  Payam Grande   MRN:  [id number]  Date: 11/21/2024  Diagnosis: SDH (subdural hematoma)    History:     No past medical history on file.    Social History     Socioeconomic History    Marital status: Unknown     Social Drivers of Health     Financial Resource Strain: Patient Unable To Answer (11/18/2024)    Overall Financial Resource Strain (CARDIA)     Difficulty of Paying Living Expenses: Patient unable to answer   Food Insecurity: Patient Unable To Answer (11/18/2024)    Hunger Vital Sign     Worried About Running Out of Food in the Last Year: Patient unable to answer     Ran Out of Food in the Last Year: Patient unable to answer   Transportation Needs: Patient Unable To Answer (11/18/2024)    TRANSPORTATION NEEDS     Transportation : Patient unable to answer   Physical Activity: Patient Unable To Answer (11/18/2024)    Exercise Vital Sign     Days of Exercise per Week: Patient unable to answer     Minutes of Exercise per Session: Patient unable to answer   Stress: Patient Unable To Answer (11/18/2024)    [employer] - Occupational Stress Questionnaire     Feeling of Stress : Patient unable to answer   Housing Stability: Patient Unable To Answer (11/18/2024)    Housing Stability Vital Sign     Unable to Pay for Housing in the Last Year: Patient unable to answer     Homeless in the Last Year: Patient unable to answer       Precautions:     Allergies as of 11/18/2024    (Not on File)       [facility] Assessment Details/Treatment      11/21/24 0923   WO Assessment   Visit Date 11/21/24   Visit Time 0923   Consult Type New   [facility] Wound   Intervention chart review;assessed;applied;orders   Teaching on-going        Wound 11/18/24 Incision Right other (see comments)   Date First Assessed: 11/18/24   Present on Original Admission: No  Primary Wound Type: Incision  Side: Right  Incision Type: (c) other (see comments)   Closure Method: Sutures  Additional Comments: xeroform, abdominal pad, 4x4, undercast, ace wrap   Wound Image     Dressing Appearance Intact;Moist drainage   Drainage Amount Moderate   Drainage Characteristics/Odor Yellow;Serous   Appearance Intact;Blistered;Moist   Black (%), Wound Tissue Color 0 %   Red (%), Wound Tissue Color 100 %   Yellow (%), Wound Tissue Color 0 %   Periwound Area Intact;Dry;Redness   Wound Edges Defined;Jagged;Irregular   Wound Length (cm) 15 cm   Wound Width (cm) 8.2 cm   Wound Surface Area (cm^2) 123 cm^2   Care Cleansed with:;Antimicrobial agent;Wound cleanser;Other (see comments)  (Vashe)   Dressing Applied;Absorptive Pad;Non-adherent;Rolled gauze     WOCN consulted for RLE blisters. Discussed plan of care with nurse Mari at bedside, nurse also assisted with repositioning during visit. Treatment recommendations put in place. RLE: Cleanse with vashe, dry well. Apply bactroban, adaptic, abd pads and wrap with kerlix BID and PRN. Patient does mobilize self and is on ICU MAYRA mattress. Nursing to continue with treatment recommendations and other preventative measures. Will follow up.    11/21/2024
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
## 2024-11-21 NOTE — PROGRESS NOTES
Inpatient Nutrition Assessment    Admit Date: 11/18/2024   Total duration of encounter: 3 days   Patient Age: 49 y.o.    Nutrition Recommendation/Prescription     Tube feeding recommendation:     Impact Peptide 1.5 goal rate 65 ml/hr to provide  1950 kcal/d  (98% est needs)  122 g protein/d (112% est needs)  1001 ml free water/d (49% est needs)  (calculations based on estimated 20 hr/d run time)     If no IV fluids running, can give 100ml q 2hr water flushes. Total water provided: 2000ml (98% est needs.)     Communication of Recommendations: reviewed with nurse    Nutrition Assessment     Malnutrition Assessment/Nutrition-Focused Physical Exam       Malnutrition Level: other (see comments) (Does not meet criteria) (11/21/24 0833)  Energy Intake (Malnutrition): other (see comments) (Unable to assess) (11/21/24 0833)  Weight Loss (Malnutrition): other (see comments) (Unable to assess) (11/21/24 0833)              Muscle Mass (Malnutrition): mild depletion (11/21/24 0833)  [place] Region (Muscle Loss): mild depletion  Clavicle Bone Region (Muscle Loss): mild depletion                    Fluid Accumulation (Malnutrition): other (see comments) (Not present) (11/21/24 0833)     Hand  Strength, Right (Malnutrition): Unable to assess (11/21/24 0833)  A minimum of two characteristics is recommended for diagnosis of either severe or non-severe malnutrition.    Chart Review    Reason Seen: continuous nutrition monitoring    Malnutrition Screening Tool Results   Have you recently lost weight without trying?: Unsure          Diagnosis:  SAH  Right tibia fracture  Right fibula fracture  Laceration to right elbow  Complete tear of lateral collateral ligament of right elbow  Laceration of extensor tendon of right forearm  SDH    Relevant Medical History: none noted    Scheduled Medications:  acetaminophen, 650 mg, Q4H  amantadine HCL, 100 mg, Daily  ceFAZolin (Ancef) IV (PEDS and ADULTS), 2 g, Q8H  docusate, 100 mg,  BID  enoxparin, 40 mg, Q12H (prophylaxis, 0900/2100)  famotidine (PF), 20 mg, BID  levETIRAcetam (Keppra) IV (PEDS and ADULTS), 500 mg, Q12H  modafiniL, 100 mg, Daily  mupirocin, , BID  polyethylene glycol, 17 g, BID  propranoloL, 20 mg, BID    Continuous Infusions:     PRN Medications:  bisacodyL, 10 mg, Daily PRN  hydrALAZINE, 20 mg, Q4H PRN  labetalol, 20 mg, Q4H PRN  melatonin, 6 mg, Nightly PRN  morphine, 4 mg, Q4H PRN  oxyCODONE, 5 mg, Q4H PRN    Calorie Containing IV Medications: no significant kcals from medications at this time    Recent Labs   Lab 11/18/24  0712 11/19/24  0357 11/19/24  0527 11/20/24  0413 11/21/24  0428    137  --  135* 136   K 3.8 3.3*  --  3.6 3.7   CALCIUM 9.1 8.1*  --  8.6 9.4    108*  --  103 104   CO2 27 23  --  22 22   BUN 14.8 13.1  --  11.0 11.4   CREATININE 0.81 0.71*  --  0.62* 0.64*   EGFRNORACEVR >60 >60  --  >60 >60   GLUCOSE 127* 112*  --  80 72*   BILITOT 0.4 0.3  --  0.5 0.5   ALKPHOS 60 40  --  58 66   ALT 23 17  --  116* 155*   AST 34 30  --  454* 598*   ALBUMIN 3.9 2.5*  --  3.2* 2.7*   CRP  --   --   --   --  214.10*   WBC 19.11  19.11*  --  10.21 16.05* 14.89*   HGB 12.3*  --  8.6* 9.8* 9.3*   HCT 36.8*  --  25.3* 29.9* 27.3*     Nutrition Orders:  Diet NPO      Appetite/Oral Intake: NPO/NPO  Factors Affecting Nutritional Intake: NPO  Social Needs Impacting Access to Food: unable to assess at this time; will attempt on follow-up  Food/Restoration/Cultural Preferences: unable to obtain  Food Allergies: unable to obtain  Last Bowel Movement: 11/20/24  Wound(s):     Wound 11/19/24 1141 Abrasion(s) Left lateral;upper Back-Tissue loss description: Partial thickness     Comments    11/19/24: Pt recently extubated. Discussed with RN, plans for NG placement. Will provide TF recommendations in case needed. Pt on able to verify subjective info at time of RD visit due to confusion.    11/21/24: TF continues. Pt with some spitting up yesterday, but now back to goal  "rate.     Anthropometrics    Height: 5' 7.99" (172.7 cm), Height Method: Estimated  Last Weight: 68 kg (149 lb 14.6 oz) (11/18/24 0900), Weight Method: Bed Scale  BMI (Calculated): 22.8  BMI Classification: normal (BMI 18.5-24.9)        Ideal Body Weight (IBW), Male: 153.94 lb     % Ideal Body Weight, Male (lb): 97.34 %                          Usual Weight Provided By: unable to obtain usual weight    Wt Readings from Last 5 Encounters:   11/18/24 68 kg (149 lb 14.6 oz)     Weight Change(s) Since Admission:   Wt Readings from Last 1 Encounters:   11/18/24 0900 68 kg (149 lb 14.6 oz)   11/18/24 0705 68 kg (150 lb)   Admit Weight: 68 kg (150 lb) (11/18/24 0705), Weight Method: Estimated    Estimated Needs    Weight Used For Calorie Calculations: 68 kg (149 lb 14.6 oz)  Energy Calorie Requirements (kcal): 1981kcal (1.3 stress factor)  Energy Need Method: Frazee-St Jeor  Weight Used For Protein Calculations: 68 kg (149 lb 14.6 oz)  Protein Requirements: 95-109gm (1.4-1.6g/kg)  Fluid Requirements (mL): 2040ml (30ml/kg)  CHO Requirement: 225gm (45% est kcal needs)     Enteral Nutrition     Formula: Impact Peptide 1.5 Gab  Rate/Volume: 65ml/hr  Water Flushes: 100ml q2hr  Additives/Modulars: none at this time  Route: nasogastric tube  Method: continuous  Total Nutrition Provided by Tube Feeding, Additives, and Flushes:  Calories Provided  1950 kcal/d, 98% needs   Protein Provided  122 g/d, 112% needs   Fluid Provided  2000 ml/d, 98% needs   Continuous feeding calculations based on estimated 20 hr/d run time unless otherwise stated.    Parenteral Nutrition     Patient not receiving parenteral nutrition support at this time.    Evaluation of Received Nutrient Intake    Calories: meeting estimated needs  Protein: meeting estimated needs    Patient Education     Not applicable.    Nutrition Diagnosis     PES: Inadequate oral intake related to acute illness as evidenced by NPO since admit (recently extubated). (active) "     PES:            Nutrition Interventions     Intervention(s): collaboration with other providers  Intervention(s): Enteral nutrition    Goal: Meet greater than 80% of nutritional needs by follow-up. (goal progressing)  Goal: Tolerate enteral feeding at goal rate by follow-up. (goal progressing)    Nutrition Goals & Monitoring     Dietitian will monitor: energy intake and enteral nutrition intake  Discharge planning: too early to determine; pending clinical course  Nutrition Risk/Follow-Up: moderate (follow-up in 3-5 days)   Please consult if re-assessment needed sooner.

## 2024-11-21 NOTE — PLAN OF CARE
Problem: Adult Inpatient Plan of Care  Goal: Plan of Care Review  Outcome: Progressing  Goal: Patient-Specific Goal (Individualized)  Outcome: Progressing  Goal: Absence of Hospital-Acquired Illness or Injury  Outcome: Progressing  Goal: Optimal Comfort and Wellbeing  Outcome: Progressing  Goal: Readiness for Transition of Care  Outcome: Progressing     Problem: Skin Injury Risk Increased  Goal: Skin Health and Integrity  Outcome: Progressing     Problem: Fall Injury Risk  Goal: Absence of Fall and Fall-Related Injury  Outcome: Progressing     Problem: Infection  Goal: Absence of Infection Signs and Symptoms  Outcome: Progressing     Problem: Wound  Goal: Optimal Coping  Outcome: Progressing  Goal: Optimal Functional Ability  Outcome: Progressing  Goal: Absence of Infection Signs and Symptoms  Outcome: Progressing  Goal: Improved Oral Intake  Outcome: Progressing  Goal: Optimal Pain Control and Function  Outcome: Progressing  Goal: Skin Health and Integrity  Outcome: Progressing  Goal: Optimal Wound Healing  Outcome: Progressing

## 2024-11-22 PROCEDURE — 94760 N-INVAS EAR/PLS OXIMETRY 1: CPT

## 2024-11-22 PROCEDURE — 63600175 PHARM REV CODE 636 W HCPCS: Performed by: SURGERY

## 2024-11-22 PROCEDURE — 92611 MOTION FLUOROSCOPY/SWALLOW: CPT

## 2024-11-22 PROCEDURE — 63600175 PHARM REV CODE 636 W HCPCS

## 2024-11-22 PROCEDURE — 25000003 PHARM REV CODE 250

## 2024-11-22 PROCEDURE — 11000001 HC ACUTE MED/SURG PRIVATE ROOM

## 2024-11-22 PROCEDURE — 25000003 PHARM REV CODE 250: Performed by: SURGERY

## 2024-11-22 RX ADMIN — ENOXAPARIN SODIUM 40 MG: 40 INJECTION SUBCUTANEOUS at 08:11

## 2024-11-22 RX ADMIN — ACETAMINOPHEN 650 MG: 325 TABLET, FILM COATED ORAL at 09:11

## 2024-11-22 RX ADMIN — POLYETHYLENE GLYCOL 3350 17 G: 17 POWDER, FOR SOLUTION ORAL at 08:11

## 2024-11-22 RX ADMIN — MUPIROCIN: 20 OINTMENT TOPICAL at 08:11

## 2024-11-22 RX ADMIN — MODAFINIL 100 MG: 100 TABLET ORAL at 08:11

## 2024-11-22 RX ADMIN — LEVETIRACETAM 500 MG: 100 INJECTION, SOLUTION INTRAVENOUS at 08:11

## 2024-11-22 RX ADMIN — DOCUSATE SODIUM LIQUID 100 MG: 100 LIQUID ORAL at 08:11

## 2024-11-22 RX ADMIN — CEFAZOLIN 2 G: 2 INJECTION, POWDER, FOR SOLUTION INTRAMUSCULAR; INTRAVENOUS at 12:11

## 2024-11-22 RX ADMIN — ACETAMINOPHEN 650 MG: 325 TABLET, FILM COATED ORAL at 05:11

## 2024-11-22 RX ADMIN — AMANTADINE HYDROCHLORIDE 100 MG: 50 SOLUTION ORAL at 08:11

## 2024-11-22 RX ADMIN — PROPRANOLOL HYDROCHLORIDE 20 MG: 20 TABLET ORAL at 08:11

## 2024-11-22 RX ADMIN — Medication 6 MG: at 08:11
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
## 2024-11-22 NOTE — PROGRESS NOTES
[facility]                       [address] [address]    PATIENT NAME:       IZA HURST  YOB: 1975  CSN:                [id number]   MRN:                [id number]  ADMIT DATE:         11/18/2024 06:51:00  PHYSICIAN:          Alee Chaves MD                            PROGRESS NOTE    DATE:  11/21/2024 00:00:00    SUBJECTIVE:  The patient is more alert and responsive.  However, he is unable to   follow commands.  He is confused and mumbling in bed.    OBJECTIVE:  VITAL SIGNS:  Temperature 98.5, heart rate 110, respiratory rate 17,   blood pressure 138/78.  GENERAL:  The patient is lying in bed.  He is restrained with gloves on his   hands.  He turns and looks at me, but when I ask questions and ask him to move   his face, he does not respond.  He is mumbling incoherently.   HEENT:  Head, the patient has bruises and scrapes.  Eyes, extraocular movements   intact.  Ears, normal.  Nose, the patient has an NG tube present on the left.    Oral cavity/oropharynx, poor dentition.  NECK:  No adenopathy.  NEUROLOGIC:  I was unable to do a cranial nerve exam.  I was unable to see the   function of his facial nerve.  However, he does have good tone and symmetry to   his face.    IMPRESSION:  Temporal bone fracture.    PLAN:  I am unable to assess voluntary movement of the face because of his   mental status.  I am going off call now, so I will sign off.  Please reconsult   if the patient shows any signs of left facial paresis.        ______________________________  Alee Chaves MD    TEM/AQS  DD:  11/22/2024  Time:  06:02AM  DT:  11/22/2024  Time:  08:05AM  Job #:  [id number]    cc:   Alee Chaves MD        PROGRESS NOTE

## 2024-11-22 NOTE — PLAN OF CARE
Juan Miguel wright  today.  I called [facility]  [phone] to requested family info. Message left with scheduling dept as the Dr listed in the M notes Ron Pope was not one of the dr when you do the menu selection.   Called Sloane's number again , recording continues not taking calls at this time
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
## 2024-11-22 NOTE — PHYSICIAN QUERY
Due to the conflicting clinical picture, please clinically validate the Acute Respiratory Failure with Hypoxia. If validated, please provide additional clinical support for the diagnosis.  Respiratory failure Ruled Out, maintained on vent for routine care or airway protection

## 2024-11-22 NOTE — PT/OT/SLP PROGRESS
Physical Therapy      Patient Name:  Payam Grande   MRN:  [id number]    Patient not seen today secondary to off floor for MBS. Will follow-up tomorrow.
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
## 2024-11-22 NOTE — PLAN OF CARE
Problem: SLP  Goal: SLP Goal  Description:   LTG: Tolerate least restrictive PO diet with no clinical signs/sx aspiration  STGs:  1.  Tolerate thermal stimulation to the anterior faucial pillars with 100% effortful swallow responses and delay less than 2 seconds.  2.  Complete base of tongue and laryngeal strengthening exercises with minimal cues  3.  Pt will tolerate 2oz of ice chips by spoon with no clinical signs/sx aspiration.     Outcome: Progressing

## 2024-11-23 PROCEDURE — 25000003 PHARM REV CODE 250: Performed by: SURGERY

## 2024-11-23 PROCEDURE — 25000003 PHARM REV CODE 250

## 2024-11-23 PROCEDURE — 63600175 PHARM REV CODE 636 W HCPCS

## 2024-11-23 PROCEDURE — 97535 SELF CARE MNGMENT TRAINING: CPT | Mod: CO

## 2024-11-23 PROCEDURE — 11000001 HC ACUTE MED/SURG PRIVATE ROOM

## 2024-11-23 PROCEDURE — 97530 THERAPEUTIC ACTIVITIES: CPT | Mod: CQ

## 2024-11-23 PROCEDURE — 63600175 PHARM REV CODE 636 W HCPCS: Performed by: SURGERY

## 2024-11-23 RX ORDER — LEVETIRACETAM 100 MG/ML
500 SOLUTION ORAL 2 TIMES DAILY
Status: DISCONTINUED | OUTPATIENT
Start: 2024-11-23 | End: 2024-11-24

## 2024-11-23 RX ADMIN — MODAFINIL 100 MG: 100 TABLET ORAL at 08:11

## 2024-11-23 RX ADMIN — POLYETHYLENE GLYCOL 3350 17 G: 17 POWDER, FOR SOLUTION ORAL at 08:11

## 2024-11-23 RX ADMIN — ENOXAPARIN SODIUM 40 MG: 40 INJECTION SUBCUTANEOUS at 08:11

## 2024-11-23 RX ADMIN — LEVETIRACETAM 500 MG: 100 INJECTION, SOLUTION INTRAVENOUS at 08:11

## 2024-11-23 RX ADMIN — AMANTADINE HYDROCHLORIDE 100 MG: 50 SOLUTION ORAL at 08:11

## 2024-11-23 RX ADMIN — LEVETIRACETAM 500 MG: 500 SOLUTION ORAL at 08:11

## 2024-11-23 RX ADMIN — MUPIROCIN: 20 OINTMENT TOPICAL at 08:11

## 2024-11-23 RX ADMIN — PROPRANOLOL HYDROCHLORIDE 20 MG: 20 TABLET ORAL at 08:11

## 2024-11-23 RX ADMIN — DOCUSATE SODIUM LIQUID 100 MG: 100 LIQUID ORAL at 08:11
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
## 2024-11-23 NOTE — PT/OT/SLP PROGRESS
Occupational Therapy   Treatment    Name: Payam Grande  MRN: [id number]  Admitting Diagnosis:  SDH (subdural hematoma)  5 Days Post-Op    Recommendations:     Recommended therapy intensity at discharge: High Intensity Therapy   Discharge Equipment Recommendations:  to be determined by next level of care  Barriers to discharge:       Assessment:     Payam Grande is a 49 y.o. male with a medical diagnosis of SDH (subdural hematoma).  He presents with poor safety awareness, unable to adhere to WB pxns. Pt. Is very impulsive and distracted. Constant cueing required for redirection throughout session, recommending High intensity therapy. Performance deficits affecting function are weakness, impaired endurance, impaired self care skills, impaired functional mobility, impaired balance.     Rehab Prognosis:  Fair; patient would benefit from acute skilled OT services to address these deficits and reach maximum level of function.       Plan:     Patient to be seen 6 x/week to address the above listed problems via self-care/home management, therapeutic activities, therapeutic exercises  Plan of Care Expires: 12/20/24  Plan of Care Reviewed with: patient    Subjective     Pain/Comfort:  Able to verbalize name  Not oriented to Name nor year    Objective:     Communicated with: RN prior to session.  Patient found HOB elevated with   upon OT entry to room.    General Precautions: Standard, aspiration    Orthopedic Precautions:RLE weight bearing as tolerated, RUE non weight bearing (RUE NWB except for ADLs, RLE WBAT in CAM boot)  Braces:  (hinged elbow brace, CAM boot)  Respiratory Status: Room air  Vital Signs: Blood Pressure: 117/80     Occupational Performance:   (Bed Mobility- Max A)  (Static sitting balance- Mod A progressing to Min A due to increased impulsivity) Pt. Very restless and distracted EOB requiring cueing for redirection and attention to task. Poor adherence to WB pxns requiring constat cueing and re adjustment  of R UE.   Total A for donning CAM BOOT.   Pt. Performing grooming task on command (washing face/oral hygiene) using L UE.   (Sit to stand- Mod A) HHA, pt. Unable to take side steps to HOB, very restless, attempting to lay down several times.   Pt. Laid back down and repositioned in bed appropriately with all needs within reach.   Therapeutic Positioning    OT interventions performed during the course of today's session in an effort to prevent and/or reduce acquired pressure injuries:   Therapeutic positioning was provided at the conclusion of session to offload all bony prominences for the prevention and/or reduction of pressure injuries      [facility] 6 Click ADL:      Patient Education:  Patient provided with verbal education education regarding safety awareness.  Additional teaching is warranted.      Patient left HOB elevated with all lines intact and call button in reach.    GOALS:   Multidisciplinary Problems       Occupational Therapy Goals          Problem: Occupational Therapy    Goal Priority Disciplines Outcome Interventions   Occupational Therapy Goal     OT, PT/OT Progressing    Description: LTG: Pt will perform basic ADLs and ADL transfers with Modified independence using LRAD by discharge.    STG: to be met by 12/20/24    Pt will complete grooming standing at sink with LRAD with SBA.  Pt will complete UB dressing with SBA.  Pt will complete LB dressing with SBA using LRAD.  Pt will complete toileting with SBA using LRAD.  Pt will complete functional mobility to/from toilet and toilet transfer with SBA using LRAD.                         Time Tracking:     OT Date of Treatment: 11/23/24  OT Start Time: 0837  OT Stop Time: 0900  OT Total Time (min): 23 min    Billable Minutes:Self Care/Home Management 2    OT/TIMUR: TIMUR     Number of TIMUR visits since last OT visit: 2    11/23/2024
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
## 2024-11-24 PROCEDURE — 25000003 PHARM REV CODE 250

## 2024-11-24 PROCEDURE — 63600175 PHARM REV CODE 636 W HCPCS: Performed by: SURGERY

## 2024-11-24 PROCEDURE — 25000003 PHARM REV CODE 250: Performed by: SURGERY

## 2024-11-24 PROCEDURE — 25000003 PHARM REV CODE 250: Performed by: NURSE PRACTITIONER

## 2024-11-24 PROCEDURE — 21400001 HC TELEMETRY ROOM

## 2024-11-24 PROCEDURE — 99900035 HC TECH TIME PER 15 MIN (STAT)

## 2024-11-24 PROCEDURE — 11000001 HC ACUTE MED/SURG PRIVATE ROOM

## 2024-11-24 RX ORDER — LEVETIRACETAM 100 MG/ML
500 SOLUTION ORAL 2 TIMES DAILY
Status: DISCONTINUED | OUTPATIENT
Start: 2024-11-24 | End: 2024-11-26

## 2024-11-24 RX ADMIN — Medication 6 MG: at 08:11

## 2024-11-24 RX ADMIN — DOCUSATE SODIUM LIQUID 100 MG: 100 LIQUID ORAL at 08:11

## 2024-11-24 RX ADMIN — LEVETIRACETAM 500 MG: 500 SOLUTION ORAL at 09:11

## 2024-11-24 RX ADMIN — POLYETHYLENE GLYCOL 3350 17 G: 17 POWDER, FOR SOLUTION ORAL at 08:11

## 2024-11-24 RX ADMIN — MODAFINIL 100 MG: 100 TABLET ORAL at 09:11

## 2024-11-24 RX ADMIN — PROPRANOLOL HYDROCHLORIDE 20 MG: 20 TABLET ORAL at 09:11

## 2024-11-24 RX ADMIN — DOCUSATE SODIUM LIQUID 100 MG: 100 LIQUID ORAL at 09:11

## 2024-11-24 RX ADMIN — LEVETIRACETAM 500 MG: 500 SOLUTION ORAL at 08:11

## 2024-11-24 RX ADMIN — PROPRANOLOL HYDROCHLORIDE 20 MG: 20 TABLET ORAL at 08:11

## 2024-11-24 RX ADMIN — AMANTADINE HYDROCHLORIDE 100 MG: 50 SOLUTION ORAL at 09:11

## 2024-11-24 RX ADMIN — ENOXAPARIN SODIUM 40 MG: 40 INJECTION SUBCUTANEOUS at 08:11

## 2024-11-24 RX ADMIN — POLYETHYLENE GLYCOL 3350 17 G: 17 POWDER, FOR SOLUTION ORAL at 09:11

## 2024-11-24 RX ADMIN — ENOXAPARIN SODIUM 40 MG: 40 INJECTION SUBCUTANEOUS at 09:11
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
## 2024-11-25 LAB
ABS NEUT (OLG): 12.1 X10(3)/MCL (ref 2.1–9.2)
ALBUMIN SERPL-MCNC: 2.7 G/DL (ref 3.5–5)
ALBUMIN/GLOB SERPL: 0.6 RATIO (ref 1.1–2)
ALP SERPL-CCNC: 148 UNIT/L (ref 40–150)
ALT SERPL-CCNC: 221 UNIT/L (ref 0–55)
ANION GAP SERPL CALC-SCNC: 8 MEQ/L
AST SERPL-CCNC: 245 UNIT/L (ref 5–34)
BILIRUB SERPL-MCNC: 0.7 MG/DL
BUN SERPL-MCNC: 22 MG/DL (ref 8.9–20.6)
CALCIUM SERPL-MCNC: 9.1 MG/DL (ref 8.4–10.2)
CHLORIDE SERPL-SCNC: 104 MMOL/L (ref 98–107)
CO2 SERPL-SCNC: 27 MMOL/L (ref 22–29)
CREAT SERPL-MCNC: 0.58 MG/DL (ref 0.72–1.25)
CREAT/UREA NIT SERPL: 38
CRP SERPL-MCNC: 147.9 MG/L
EOSINOPHIL NFR BLD MANUAL: 0.18 X10(3)/MCL (ref 0–0.9)
EOSINOPHIL NFR BLD MANUAL: 1 %
ERYTHROCYTE [DISTWIDTH] IN BLOOD BY AUTOMATED COUNT: 13.8 % (ref 11.5–17)
GFR SERPLBLD CREATININE-BSD FMLA CKD-EPI: >60 ML/MIN/1.73/M2
GLOBULIN SER-MCNC: 4.2 GM/DL (ref 2.4–3.5)
GLUCOSE SERPL-MCNC: 93 MG/DL (ref 74–100)
HCT VFR BLD AUTO: 23.8 % (ref 42–52)
HGB BLD-MCNC: 8 G/DL (ref 14–18)
INSTRUMENT WBC (OLG): 18.34 X10(3)/MCL
LYMPHOCYTES NFR BLD MANUAL: 17 %
LYMPHOCYTES NFR BLD MANUAL: 3.12 X10(3)/MCL
MCH RBC QN AUTO: 30.3 PG (ref 27–31)
MCHC RBC AUTO-ENTMCNC: 33.6 G/DL (ref 33–36)
MCV RBC AUTO: 90.2 FL (ref 80–94)
MONOCYTES NFR BLD MANUAL: 16 %
MONOCYTES NFR BLD MANUAL: 2.93 X10(3)/MCL (ref 0.1–1.3)
NEUTROPHILS NFR BLD MANUAL: 66 %
NRBC BLD AUTO-RTO: 0.3 %
PLATELET # BLD AUTO: 738 X10(3)/MCL (ref 130–400)
PLATELET # BLD EST: ABNORMAL 10*3/UL
PMV BLD AUTO: 9.1 FL (ref 7.4–10.4)
POCT GLUCOSE: 134 MG/DL (ref 70–110)
POIKILOCYTOSIS BLD QL SMEAR: ABNORMAL
POLYCHROMASIA BLD QL SMEAR: ABNORMAL
POTASSIUM SERPL-SCNC: 4 MMOL/L (ref 3.5–5.1)
PREALB SERPL-MCNC: 14.5 MG/DL (ref 18–45)
PROT SERPL-MCNC: 6.9 GM/DL (ref 6.4–8.3)
RBC # BLD AUTO: 2.64 X10(6)/MCL (ref 4.7–6.1)
RBC MORPH BLD: ABNORMAL
SODIUM SERPL-SCNC: 139 MMOL/L (ref 136–145)
STOMATOCYTES (OLG): ABNORMAL
WBC # BLD AUTO: 18.34 X10(3)/MCL (ref 4.5–11.5)

## 2024-11-25 PROCEDURE — 63600175 PHARM REV CODE 636 W HCPCS

## 2024-11-25 PROCEDURE — 11000001 HC ACUTE MED/SURG PRIVATE ROOM

## 2024-11-25 PROCEDURE — 25000003 PHARM REV CODE 250: Performed by: NURSE PRACTITIONER

## 2024-11-25 PROCEDURE — 99900031 HC PATIENT EDUCATION (STAT)

## 2024-11-25 PROCEDURE — 97530 THERAPEUTIC ACTIVITIES: CPT

## 2024-11-25 PROCEDURE — 25000003 PHARM REV CODE 250

## 2024-11-25 PROCEDURE — 86140 C-REACTIVE PROTEIN: CPT | Performed by: NURSE PRACTITIONER

## 2024-11-25 PROCEDURE — 63600175 PHARM REV CODE 636 W HCPCS: Performed by: SURGERY

## 2024-11-25 PROCEDURE — 21400001 HC TELEMETRY ROOM

## 2024-11-25 PROCEDURE — 92526 ORAL FUNCTION THERAPY: CPT

## 2024-11-25 PROCEDURE — 97535 SELF CARE MNGMENT TRAINING: CPT | Mod: CO

## 2024-11-25 PROCEDURE — 36415 COLL VENOUS BLD VENIPUNCTURE: CPT | Performed by: NURSE PRACTITIONER

## 2024-11-25 PROCEDURE — 25000003 PHARM REV CODE 250: Performed by: SURGERY

## 2024-11-25 PROCEDURE — 99900035 HC TECH TIME PER 15 MIN (STAT)

## 2024-11-25 PROCEDURE — 80053 COMPREHEN METABOLIC PANEL: CPT | Performed by: NURSE PRACTITIONER

## 2024-11-25 PROCEDURE — 85025 COMPLETE CBC W/AUTO DIFF WBC: CPT | Performed by: NURSE PRACTITIONER

## 2024-11-25 PROCEDURE — 84134 ASSAY OF PREALBUMIN: CPT | Performed by: NURSE PRACTITIONER

## 2024-11-25 RX ORDER — OXYCODONE HYDROCHLORIDE 10 MG/1
10 TABLET ORAL EVERY 4 HOURS PRN
Status: DISCONTINUED | OUTPATIENT
Start: 2024-11-25 | End: 2024-12-03

## 2024-11-25 RX ORDER — OXYCODONE HYDROCHLORIDE 5 MG/1
5 TABLET ORAL ONCE
Status: COMPLETED | OUTPATIENT
Start: 2024-11-25 | End: 2024-11-25

## 2024-11-25 RX ORDER — SENNOSIDES 8.8 MG/5ML
5 LIQUID ORAL DAILY
Status: DISCONTINUED | OUTPATIENT
Start: 2024-11-25 | End: 2024-12-03

## 2024-11-25 RX ORDER — DIPHENHYDRAMINE HYDROCHLORIDE 50 MG/ML
25 INJECTION INTRAMUSCULAR; INTRAVENOUS ONCE
Status: COMPLETED | OUTPATIENT
Start: 2024-11-25 | End: 2024-11-25

## 2024-11-25 RX ADMIN — OXYCODONE HYDROCHLORIDE 5 MG: 5 TABLET ORAL at 06:11

## 2024-11-25 RX ADMIN — AMANTADINE HYDROCHLORIDE 100 MG: 50 SOLUTION ORAL at 09:11

## 2024-11-25 RX ADMIN — PROPRANOLOL HYDROCHLORIDE 20 MG: 20 TABLET ORAL at 09:11

## 2024-11-25 RX ADMIN — OXYCODONE HYDROCHLORIDE 5 MG: 5 TABLET ORAL at 09:11

## 2024-11-25 RX ADMIN — OXYCODONE HYDROCHLORIDE 10 MG: 10 TABLET ORAL at 08:11

## 2024-11-25 RX ADMIN — Medication 6 MG: at 08:11

## 2024-11-25 RX ADMIN — POLYETHYLENE GLYCOL 3350 17 G: 17 POWDER, FOR SOLUTION ORAL at 09:11

## 2024-11-25 RX ADMIN — ENOXAPARIN SODIUM 40 MG: 40 INJECTION SUBCUTANEOUS at 08:11

## 2024-11-25 RX ADMIN — SODIUM CHLORIDE 500 ML: 9 INJECTION, SOLUTION INTRAVENOUS at 09:11

## 2024-11-25 RX ADMIN — MODAFINIL 100 MG: 100 TABLET ORAL at 09:11

## 2024-11-25 RX ADMIN — SENNOSIDES 5 ML: 8.8 LIQUID ORAL at 08:11

## 2024-11-25 RX ADMIN — LEVETIRACETAM 500 MG: 500 SOLUTION ORAL at 09:11

## 2024-11-25 RX ADMIN — DIPHENHYDRAMINE HYDROCHLORIDE 25 MG: 50 INJECTION INTRAMUSCULAR; INTRAVENOUS at 02:11

## 2024-11-25 RX ADMIN — LEVETIRACETAM 500 MG: 500 SOLUTION ORAL at 08:11

## 2024-11-25 RX ADMIN — ENOXAPARIN SODIUM 40 MG: 40 INJECTION SUBCUTANEOUS at 09:11

## 2024-11-25 RX ADMIN — PROPRANOLOL HYDROCHLORIDE 20 MG: 20 TABLET ORAL at 08:11

## 2024-11-25 RX ADMIN — OXYCODONE HYDROCHLORIDE 5 MG: 5 TABLET ORAL at 03:11

## 2024-11-25 RX ADMIN — POLYETHYLENE GLYCOL 3350 17 G: 17 POWDER, FOR SOLUTION ORAL at 08:11
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
## 2024-11-25 NOTE — PT/OT/SLP PROGRESS
[facility]  Speech Language Pathology Department  Dysphagia Therapy Progress Note    Patient Name:  Payam Grande   MRN:  [id number]    Recommendations     General recommendations:  dysphagia therapy  Solid texture recommendation:  NPO  Liquid consistency recommendation: NPO   Medications: NPO    Barriers to safe discharge:  severity of impairment, safety awareness, and level of skilled assistance needed    Subjective     Patient awake and alert.  Spiritual/Cultural/Latter day Beliefs/Practices that affect care: no    Pain/Comfort: Pain Rating 1: 0/10    Objective     Therapeutic PO Trials:  Consistency Amount Fed By Oral Symptoms Pharyngeal Symptoms   Ice chips x5 SLP Bolus holding  Prolonged bolus formation/mastication Wet vocal quality after swallow  Cough after the swallow     Assessment     Pt continues to present with oropharyngeal dysphagia and remains unsafe for PO diet.    Outcome Measures     Functional Oral Intake Scale: 1 - Nothing by mouth    Goals     Multidisciplinary Problems       SLP Goals          Problem: SLP    Goal Priority Disciplines Outcome   SLP Goal     SLP Progressing   Description:   LTG: Tolerate least restrictive PO diet with no clinical signs/sx aspiration  STGs:  1.  Tolerate thermal stimulation to the anterior faucial pillars with 100% effortful swallow responses and delay less than 2 seconds.  2.  Complete base of tongue and laryngeal strengthening exercises with minimal cues  3.  Pt will tolerate 2oz of ice chips by spoon with no clinical signs/sx aspiration.                        Patient Education     Patient provided with verbal education regarding ST POC.  Additional teaching is warranted.    Plan     Will continue to follow and tx as appropriate.    SLP Follow-Up:  Yes   Patient to be seen:  5 x/week   Plan of Care expires:  11/29/24  Plan of Care reviewed with:  patient       Time Tracking     SLP Treatment Date:   11/25/24  Speech Start Time:   1200  Speech Stop Time:  1220     Speech Total Time (min):  20 min    Billable minutes:  Treatment of Swallow Dysfunction, 20 minutes       11/25/2024
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
## 2024-11-25 NOTE — PLAN OF CARE
Problem: Adult Inpatient Plan of Care  Goal: Optimal Comfort and Wellbeing  Outcome: Progressing     Problem: Skin Injury Risk Increased  Goal: Skin Health and Integrity  Outcome: Progressing     Problem: Wound  Goal: Optimal Functional Ability  Outcome: Progressing  Goal: Skin Health and Integrity  Outcome: Progressing

## 2024-11-26 LAB
BASOPHILS # BLD AUTO: 0.06 X10(3)/MCL
BASOPHILS NFR BLD AUTO: 0.4 %
EOSINOPHIL # BLD AUTO: 0.55 X10(3)/MCL (ref 0–0.9)
EOSINOPHIL NFR BLD AUTO: 3.3 %
ERYTHROCYTE [DISTWIDTH] IN BLOOD BY AUTOMATED COUNT: 13.8 % (ref 11.5–17)
HCT VFR BLD AUTO: 23.2 % (ref 42–52)
HGB BLD-MCNC: 7.6 G/DL (ref 14–18)
IMM GRANULOCYTES # BLD AUTO: 0.48 X10(3)/MCL (ref 0–0.04)
IMM GRANULOCYTES NFR BLD AUTO: 2.8 %
LYMPHOCYTES # BLD AUTO: 3.53 X10(3)/MCL (ref 0.6–4.6)
LYMPHOCYTES NFR BLD AUTO: 20.9 %
MCH RBC QN AUTO: 29.9 PG (ref 27–31)
MCHC RBC AUTO-ENTMCNC: 32.8 G/DL (ref 33–36)
MCV RBC AUTO: 91.3 FL (ref 80–94)
MONOCYTES # BLD AUTO: 1.75 X10(3)/MCL (ref 0.1–1.3)
MONOCYTES NFR BLD AUTO: 10.4 %
NEUTROPHILS # BLD AUTO: 10.48 X10(3)/MCL (ref 2.1–9.2)
NEUTROPHILS NFR BLD AUTO: 62.2 %
NRBC BLD AUTO-RTO: 0.2 %
OHS QRS DURATION: 88 MS
OHS QTC CALCULATION: 422 MS
PLATELET # BLD AUTO: 836 X10(3)/MCL (ref 130–400)
PMV BLD AUTO: 8.9 FL (ref 7.4–10.4)
POCT GLUCOSE: 119 MG/DL (ref 70–110)
RBC # BLD AUTO: 2.54 X10(6)/MCL (ref 4.7–6.1)
TROPONIN I SERPL-MCNC: <0.01 NG/ML (ref 0–0.04)
WBC # BLD AUTO: 16.85 X10(3)/MCL (ref 4.5–11.5)

## 2024-11-26 PROCEDURE — 92526 ORAL FUNCTION THERAPY: CPT

## 2024-11-26 PROCEDURE — 97535 SELF CARE MNGMENT TRAINING: CPT

## 2024-11-26 PROCEDURE — 93005 ELECTROCARDIOGRAM TRACING: CPT

## 2024-11-26 PROCEDURE — 25000003 PHARM REV CODE 250: Performed by: SURGERY

## 2024-11-26 PROCEDURE — 36415 COLL VENOUS BLD VENIPUNCTURE: CPT

## 2024-11-26 PROCEDURE — 84484 ASSAY OF TROPONIN QUANT: CPT

## 2024-11-26 PROCEDURE — 36415 COLL VENOUS BLD VENIPUNCTURE: CPT | Performed by: NURSE PRACTITIONER

## 2024-11-26 PROCEDURE — 99233 SBSQ HOSP IP/OBS HIGH 50: CPT | Mod: ,,, | Performed by: SURGERY

## 2024-11-26 PROCEDURE — 94799 UNLISTED PULMONARY SVC/PX: CPT

## 2024-11-26 PROCEDURE — 21400001 HC TELEMETRY ROOM

## 2024-11-26 PROCEDURE — 99900035 HC TECH TIME PER 15 MIN (STAT)

## 2024-11-26 PROCEDURE — 85025 COMPLETE CBC W/AUTO DIFF WBC: CPT | Performed by: NURSE PRACTITIONER

## 2024-11-26 PROCEDURE — 63600175 PHARM REV CODE 636 W HCPCS: Performed by: SURGERY

## 2024-11-26 PROCEDURE — 25000003 PHARM REV CODE 250

## 2024-11-26 PROCEDURE — 97530 THERAPEUTIC ACTIVITIES: CPT

## 2024-11-26 PROCEDURE — 63600175 PHARM REV CODE 636 W HCPCS: Performed by: NURSE PRACTITIONER

## 2024-11-26 PROCEDURE — 99900031 HC PATIENT EDUCATION (STAT)

## 2024-11-26 RX ORDER — ACETAMINOPHEN 325 MG/1
650 TABLET ORAL EVERY 6 HOURS
Status: DISCONTINUED | OUTPATIENT
Start: 2024-11-26 | End: 2024-11-27

## 2024-11-26 RX ORDER — AMANTADINE HYDROCHLORIDE 50 MG/5ML
100 SOLUTION ORAL DAILY
Status: DISCONTINUED | OUTPATIENT
Start: 2024-11-27 | End: 2024-12-03

## 2024-11-26 RX ORDER — METHOCARBAMOL 500 MG/1
500 TABLET, FILM COATED ORAL EVERY 8 HOURS
Status: DISCONTINUED | OUTPATIENT
Start: 2024-11-26 | End: 2024-12-03

## 2024-11-26 RX ORDER — GABAPENTIN 300 MG/1
300 CAPSULE ORAL 3 TIMES DAILY
Status: DISCONTINUED | OUTPATIENT
Start: 2024-11-26 | End: 2024-12-03

## 2024-11-26 RX ORDER — ZIPRASIDONE MESYLATE 20 MG/ML
20 INJECTION, POWDER, LYOPHILIZED, FOR SOLUTION INTRAMUSCULAR EVERY 4 HOURS PRN
Status: DISCONTINUED | OUTPATIENT
Start: 2024-11-26 | End: 2024-12-05 | Stop reason: HOSPADM

## 2024-11-26 RX ADMIN — ENOXAPARIN SODIUM 40 MG: 40 INJECTION SUBCUTANEOUS at 09:11

## 2024-11-26 RX ADMIN — ENOXAPARIN SODIUM 40 MG: 40 INJECTION SUBCUTANEOUS at 08:11

## 2024-11-26 RX ADMIN — PROPRANOLOL HYDROCHLORIDE 20 MG: 20 TABLET ORAL at 09:11

## 2024-11-26 RX ADMIN — AMANTADINE HYDROCHLORIDE 100 MG: 50 SOLUTION ORAL at 09:11

## 2024-11-26 RX ADMIN — GABAPENTIN 300 MG: 300 CAPSULE ORAL at 08:11

## 2024-11-26 RX ADMIN — ZIPRASIDONE MESYLATE 20 MG: 20 INJECTION, POWDER, LYOPHILIZED, FOR SOLUTION INTRAMUSCULAR at 11:11

## 2024-11-26 RX ADMIN — METHOCARBAMOL 500 MG: 500 TABLET ORAL at 01:11

## 2024-11-26 RX ADMIN — ACETAMINOPHEN 650 MG: 325 TABLET, FILM COATED ORAL at 05:11

## 2024-11-26 RX ADMIN — PROPRANOLOL HYDROCHLORIDE 20 MG: 20 TABLET ORAL at 08:11

## 2024-11-26 RX ADMIN — METHOCARBAMOL 500 MG: 500 TABLET ORAL at 09:11

## 2024-11-26 RX ADMIN — POLYETHYLENE GLYCOL 3350 17 G: 17 POWDER, FOR SOLUTION ORAL at 08:11

## 2024-11-26 RX ADMIN — ACETAMINOPHEN 650 MG: 325 TABLET, FILM COATED ORAL at 11:11

## 2024-11-26 RX ADMIN — OXYCODONE HYDROCHLORIDE 10 MG: 10 TABLET ORAL at 01:11

## 2024-11-26 RX ADMIN — Medication 6 MG: at 08:11

## 2024-11-26 RX ADMIN — OXYCODONE HYDROCHLORIDE 5 MG: 5 TABLET ORAL at 09:11

## 2024-11-26 RX ADMIN — POLYETHYLENE GLYCOL 3350 17 G: 17 POWDER, FOR SOLUTION ORAL at 09:11

## 2024-11-26 RX ADMIN — OXYCODONE HYDROCHLORIDE 10 MG: 10 TABLET ORAL at 08:11

## 2024-11-26 NOTE — PROGRESS NOTES
Inpatient Nutrition Assessment    Admit Date: 11/18/2024   Total duration of encounter: 8 days   Patient Age: 49 y.o.    Nutrition Recommendation/Prescription     Tube feeding recommendation:     Impact Peptide 1.5 goal rate 65 ml/hr to provide  1950 kcal/d  (98% est needs)  122 g protein/d (112% est needs)  1001 ml free water/d (49% est needs)  (calculations based on estimated 20 hr/d run time)     If no IV fluids running, can give 100ml q 2hr water flushes. Total water provided: 2000ml (98% est needs.)     Communication of Recommendations: reviewed with nurse    Nutrition Assessment     Malnutrition Assessment/Nutrition-Focused Physical Exam       Malnutrition Level: other (see comments) (Does not meet criteria) (11/21/24 0833)  Energy Intake (Malnutrition): other (see comments) (Unable to assess) (11/21/24 0833)  Weight Loss (Malnutrition): other (see comments) (Unable to assess) (11/21/24 0833)              Muscle Mass (Malnutrition): mild depletion (11/21/24 0833)  Restorationist Region (Muscle Loss): mild depletion  Clavicle Bone Region (Muscle Loss): mild depletion                    Fluid Accumulation (Malnutrition): other (see comments) (Not present) (11/21/24 0833)     Hand  Strength, Right (Malnutrition): Unable to assess (11/21/24 0833)  A minimum of two characteristics is recommended for diagnosis of either severe or non-severe malnutrition.    Chart Review    Reason Seen: continuous nutrition monitoring    Malnutrition Screening Tool Results   Have you recently lost weight without trying?: Unsure  Have you been eating poorly because of a decreased appetite?: No (MARC)   MST Score: 2   Diagnosis:  SAH  Right tibia fracture  Right fibula fracture  Laceration to right elbow  Complete tear of lateral collateral ligament of right elbow  Laceration of extensor tendon of right forearm  SDH    Relevant Medical History: none noted    Scheduled Medications:  acetaminophen, 650 mg, Q6H  amantadine HCL, 100 mg,  Daily  enoxparin, 40 mg, Q12H (prophylaxis, 0900/2100)  gabapentin, 300 mg, TID  methocarbamoL, 500 mg, Q8H  polyethylene glycol, 17 g, BID  propranoloL, 20 mg, BID  sennosides 8.8 mg/5 ml, 5 mL, Daily    Continuous Infusions:     PRN Medications:  bisacodyL, 10 mg, Daily PRN  hydrALAZINE, 20 mg, Q4H PRN  labetalol, 20 mg, Q4H PRN  melatonin, 6 mg, Nightly PRN  oxyCODONE, 5 mg, Q4H PRN  oxyCODONE, 10 mg, Q4H PRN  ziprasidone, 20 mg, Q4H PRN    Calorie Containing IV Medications: no significant kcals from medications at this time    Recent Labs   Lab 11/20/24  0413 11/21/24  0428 11/25/24  0534 11/26/24  0407   * 136 139  --    K 3.6 3.7 4.0  --    CALCIUM 8.6 9.4 9.1  --     104 104  --    CO2 22 22 27  --    BUN 11.0 11.4 22.0*  --    CREATININE 0.62* 0.64* 0.58*  --    EGFRNORACEVR >60 >60 >60  --    GLUCOSE 80 72* 93  --    BILITOT 0.5 0.5 0.7  --    ALKPHOS 58 66 148  --    * 155* 221*  --    * 598* 245*  --    ALBUMIN 3.2* 2.7* 2.7*  --    PREALB  --  11.6* 14.5*  --    CRP  --  214.10* 147.90*  --    WBC 16.05* 14.89* 18.34  18.34* 16.85*   HGB 9.8* 9.3* 8.0* 7.6*   HCT 29.9* 27.3* 23.8* 23.2*     Nutrition Orders:  Diet NPO  Tube Feedings/Formulas 65; 1,300; Impact Peptide 1.5; NG; 100; Every 2 hours    Appetite/Oral Intake: NPO/NPO  Factors Affecting Nutritional Intake: NPO  Social Needs Impacting Access to Food: unable to assess at this time; will attempt on follow-up  Food/Worship/Cultural Preferences: unable to obtain  Food Allergies: unable to obtain  Last Bowel Movement: 11/24/24  Wound(s):     Wound 11/19/24 1141 Abrasion(s) Left lateral;upper Back-Tissue loss description: Partial thickness     Comments    11/19/24: Pt recently extubated. Discussed with RN, plans for NG placement. Will provide TF recommendations in case needed. Pt on able to verify subjective info at time of RD visit due to confusion.    11/21/24: TF continues. Pt with some spitting up yesterday, but now  "back to goal rate.     11/26/24 pt tolerating tube feeding per nursing    Anthropometrics    Height: 5' 7.99" (172.7 cm), Height Method: Estimated  Last Weight: 68 kg (149 lb 14.6 oz) (11/18/24 0900), Weight Method: Bed Scale  BMI (Calculated): 22.8  BMI Classification: normal (BMI 18.5-24.9)        Ideal Body Weight (IBW), Male: 153.94 lb     % Ideal Body Weight, Male (lb): 97.34 %                          Usual Weight Provided By: unable to obtain usual weight    Wt Readings from Last 5 Encounters:   11/18/24 68 kg (149 lb 14.6 oz)     Weight Change(s) Since Admission:   Wt Readings from Last 1 Encounters:   11/18/24 0900 68 kg (149 lb 14.6 oz)   11/18/24 0705 68 kg (150 lb)   Admit Weight: 68 kg (150 lb) (11/18/24 0705), Weight Method: Estimated    Estimated Needs    Weight Used For Calorie Calculations: 68 kg (149 lb 14.6 oz)  Energy Calorie Requirements (kcal): 1981kcal (1.3 stress factor)  Energy Need Method: Piute-St Jeor  Weight Used For Protein Calculations: 68 kg (149 lb 14.6 oz)  Protein Requirements: 95-109gm (1.4-1.6g/kg)  Fluid Requirements (mL): 2040ml (30ml/kg)  CHO Requirement: 225gm (45% est kcal needs)     Enteral Nutrition     Formula: Impact Peptide 1.5 Gab  Rate/Volume: 65ml/hr  Water Flushes: 100ml q2hr  Additives/Modulars: none at this time  Route: nasogastric tube  Method: continuous  Total Nutrition Provided by Tube Feeding, Additives, and Flushes:  Calories Provided  1950 kcal/d, 98% needs   Protein Provided  122 g/d, 112% needs   Fluid Provided  2000 ml/d, 98% needs   Continuous feeding calculations based on estimated 20 hr/d run time unless otherwise stated.    Parenteral Nutrition     Patient not receiving parenteral nutrition support at this time.    Evaluation of Received Nutrient Intake    Calories: meeting estimated needs  Protein: meeting estimated needs    Patient Education     Not applicable.    Nutrition Diagnosis     PES: Inadequate oral intake related to acute illness as " evidenced by NPO since admit (recently extubated). (active)     PES:            Nutrition Interventions     Intervention(s): collaboration with other providers  Intervention(s): Enteral nutrition    Goal: Meet greater than 80% of nutritional needs by follow-up. (goal progressing)  Goal: Tolerate enteral feeding at goal rate by follow-up. (goal progressing)    Nutrition Goals & Monitoring     Dietitian will monitor: energy intake and enteral nutrition intake  Discharge planning: too early to determine; pending clinical course  Nutrition Risk/Follow-Up: moderate (follow-up in 3-5 days)   Please consult if re-assessment needed sooner.

## 2024-11-26 NOTE — NURSING
Was called to the room by PCAMoises. She states pt broke the hinge brace by hitting it against the railing. Assessed pt/brace. Brace cannot be fixed and is a hazard to pt. Removed brace and placed a sling on RUE, until orthotics can replace. Pulse to RUE Wnl. Will continue to monitor. Educated/ask PCA to try to prevent pt from hitting his arm against railing by using pillows or wedge and call nurse if he attempts again. She verbalized understanding.
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
## 2024-11-26 NOTE — PT/OT/SLP PROGRESS
[facility]  Speech Language Pathology Department  Dysphagia Therapy Progress Note    Patient Name:  Payam Grande   MRN:  [id number]    Recommendations     General recommendations:  dysphagia therapy  Solid texture recommendation:  NPO  Liquid consistency recommendation: NPO   Medications: NPO    Barriers to safe discharge:  severity of impairment, safety awareness, and level of skilled assistance needed    Subjective     Patient awake and alert.  Spiritual/Cultural/Evangelical Beliefs/Practices that affect care: no    Pain/Comfort: Pain Rating 1: 0/10    Objective     Therapeutic PO Trials:  Consistency Amount Fed By Oral Symptoms Pharyngeal Symptoms   Ice chips x8 SLP Bolus holding  Prolonged bolus formation/mastication Wet vocal quality after swallow  Cough after the swallow     Assessment     Pt continues to present with oropharyngeal dysphagia and remains unsafe for PO diet.    Outcome Measures     Functional Oral Intake Scale: 1 - Nothing by mouth    Goals     Multidisciplinary Problems       SLP Goals          Problem: SLP    Goal Priority Disciplines Outcome   SLP Goal     SLP Progressing   Description:   LTG: Tolerate least restrictive PO diet with no clinical signs/sx aspiration  STGs:  1.  Tolerate thermal stimulation to the anterior faucial pillars with 100% effortful swallow responses and delay less than 2 seconds.  2.  Complete base of tongue and laryngeal strengthening exercises with minimal cues  3.  Pt will tolerate 2oz of ice chips by spoon with no clinical signs/sx aspiration.                        Patient Education     Patient provided with verbal education regarding ST POC.  Additional teaching is warranted.    Plan     Will continue to follow and tx as appropriate.    SLP Follow-Up:  Yes   Patient to be seen:  5 x/week   Plan of Care expires:  11/29/24  Plan of Care reviewed with:  patient       Time Tracking     SLP Treatment Date:   11/26/24  Speech Start Time:   1450  Speech Stop Time:  1500     Speech Total Time (min):  10 min    Billable minutes:  Treatment of Swallow Dysfunction, 10 minutes       11/26/2024

## 2024-11-26 NOTE — PT/OT/SLP PROGRESS
Occupational Therapy   Treatment    Name: Payam Grande  MRN: [id number]  Admitting Diagnosis:  SDH (subdural hematoma)  8 Days Post-Op    Recommendations:     Recommended therapy intensity at discharge: High Intensity Therapy   Discharge Equipment Recommendations:  to be determined by next level of care  Barriers to discharge:       Assessment:     Payam Grande is a 49 y.o. male with a medical diagnosis of SDH (subdural hematoma).  He presents with poor safety awareness, unable to adhere to WB pxns. Pt. Is very impulsive and distracted, very fatigued after being very agitated last night . Constant cueing required for redirection throughout session, recommending High intensity therapy. Performance deficits affecting function are weakness, impaired endurance, impaired self care skills, impaired functional mobility, impaired balance.     Rehab Prognosis:  Fair; patient would benefit from acute skilled OT services to address these deficits and reach maximum level of function.       Plan:     Patient to be seen 6 x/week to address the above listed problems via self-care/home management, therapeutic activities, therapeutic exercises  Plan of Care Expires: 12/20/24  Plan of Care Reviewed with: patient    Subjective     Pain/Comfort:  Able to verbalize name  Not oriented to Name nor year    Objective:     Communicated with: RN prior to session.  Patient found HOB elevated with   upon OT entry to room.    General Precautions: Standard, aspiration    Orthopedic Precautions:RLE weight bearing as tolerated, RUE non weight bearing (RUE NWB except for ADLs, RLE WBAT in CAM boot)  Braces:  (hinged elbow brace, CAM boot)  Respiratory Status: Room air  Vital Signs: Blood Pressure: 117/80     Occupational Performance:   (Bed Mobility- Mod A, cues)  (Static sitting balance- min/SBA, Pt. Fatigued  and distracted EOB requiring cueing for redirection and attention to task. Poor adherence to WB pxns requiring constat cueing and re  adjustment of R UE.   Total A for donning CAM BOOT.   Pt. Performing grooming task on command (washing face) using L UE.   (Sit to stand- Mod A) HHA, pt. Unable to take side steps to HOB, very fatigued and groggy today, .   Pt. Laid back down and repositioned in bed appropriately with all needs within reach.   Therapeutic Positioning    OT interventions performed during the course of today's session in an effort to prevent and/or reduce acquired pressure injuries:   Therapeutic positioning was provided at the conclusion of session to offload all bony prominences for the prevention and/or reduction of pressure injuries      [facility] 6 Click ADL:      Patient Education:  Patient provided with verbal education education regarding safety awareness.  Additional teaching is warranted.      Patient left HOB elevated with all lines intact and call button in reach.    GOALS:   Multidisciplinary Problems       Occupational Therapy Goals          Problem: Occupational Therapy    Goal Priority Disciplines Outcome Interventions   Occupational Therapy Goal     OT, PT/OT Progressing    Description: LTG: Pt will perform basic ADLs and ADL transfers with Modified independence using LRAD by discharge.    STG: to be met by 12/20/24    Pt will complete grooming standing at sink with LRAD with SBA.  Pt will complete UB dressing with SBA.  Pt will complete LB dressing with SBA using LRAD.  Pt will complete toileting with SBA using LRAD.  Pt will complete functional mobility to/from toilet and toilet transfer with SBA using LRAD.                         Time Tracking:     OT Date of Treatment: 11/26/24  OT Start Time: 1002  OT Stop Time: 1018  OT Total Time (min): 16 min  Billable Minutes:Self Care/Home Management 1    OT/TIMUR: OT     Number of TIMUR visits since last OT visit: 4    11/26/2024

## 2024-11-26 NOTE — PLAN OF CARE
Problem: Fall Injury Risk  Goal: Absence of Fall and Fall-Related Injury  Outcome: Progressing  Intervention: Identify and Manage Contributors  Flowsheets (Taken 11/25/2024 1835)  Medication Review/Management: medications reviewed  Intervention: Promote Injury-Free Environment  Flowsheets (Taken 11/25/2024 1835)  Safety Promotion/Fall Prevention:   assistive device/personal item within reach   side rails raised x 3

## 2024-11-27 LAB
BASOPHILS # BLD AUTO: 0.1 X10(3)/MCL
BASOPHILS NFR BLD AUTO: 0.5 %
EOSINOPHIL # BLD AUTO: 0.42 X10(3)/MCL (ref 0–0.9)
EOSINOPHIL NFR BLD AUTO: 2.1 %
ERYTHROCYTE [DISTWIDTH] IN BLOOD BY AUTOMATED COUNT: 13.7 % (ref 11.5–17)
HCT VFR BLD AUTO: 26 % (ref 42–52)
HGB BLD-MCNC: 8.4 G/DL (ref 14–18)
IMM GRANULOCYTES # BLD AUTO: 0.55 X10(3)/MCL (ref 0–0.04)
IMM GRANULOCYTES NFR BLD AUTO: 2.8 %
LYMPHOCYTES # BLD AUTO: 2.89 X10(3)/MCL (ref 0.6–4.6)
LYMPHOCYTES NFR BLD AUTO: 14.8 %
MCH RBC QN AUTO: 30 PG (ref 27–31)
MCHC RBC AUTO-ENTMCNC: 32.3 G/DL (ref 33–36)
MCV RBC AUTO: 92.9 FL (ref 80–94)
MONOCYTES # BLD AUTO: 1.49 X10(3)/MCL (ref 0.1–1.3)
MONOCYTES NFR BLD AUTO: 7.6 %
NEUTROPHILS # BLD AUTO: 14.11 X10(3)/MCL (ref 2.1–9.2)
NEUTROPHILS NFR BLD AUTO: 72.2 %
NRBC BLD AUTO-RTO: 0.1 %
PLATELET # BLD AUTO: 1151 X10(3)/MCL (ref 130–400)
PLATELETS.RETICULATED NFR BLD AUTO: 2 % (ref 0.9–11.2)
PMV BLD AUTO: 8.8 FL (ref 7.4–10.4)
RBC # BLD AUTO: 2.8 X10(6)/MCL (ref 4.7–6.1)
WBC # BLD AUTO: 19.56 X10(3)/MCL (ref 4.5–11.5)

## 2024-11-27 PROCEDURE — 36415 COLL VENOUS BLD VENIPUNCTURE: CPT

## 2024-11-27 PROCEDURE — 25000003 PHARM REV CODE 250

## 2024-11-27 PROCEDURE — 94799 UNLISTED PULMONARY SVC/PX: CPT | Mod: XB

## 2024-11-27 PROCEDURE — 99900031 HC PATIENT EDUCATION (STAT)

## 2024-11-27 PROCEDURE — 63600175 PHARM REV CODE 636 W HCPCS: Performed by: SURGERY

## 2024-11-27 PROCEDURE — 25000003 PHARM REV CODE 250: Performed by: SURGERY

## 2024-11-27 PROCEDURE — 99900035 HC TECH TIME PER 15 MIN (STAT)

## 2024-11-27 PROCEDURE — 94760 N-INVAS EAR/PLS OXIMETRY 1: CPT

## 2024-11-27 PROCEDURE — 21400001 HC TELEMETRY ROOM

## 2024-11-27 PROCEDURE — 25000003 PHARM REV CODE 250: Performed by: NURSE PRACTITIONER

## 2024-11-27 PROCEDURE — 85025 COMPLETE CBC W/AUTO DIFF WBC: CPT

## 2024-11-27 PROCEDURE — 97535 SELF CARE MNGMENT TRAINING: CPT | Mod: CO

## 2024-11-27 PROCEDURE — 97164 PT RE-EVAL EST PLAN CARE: CPT

## 2024-11-27 PROCEDURE — 87040 BLOOD CULTURE FOR BACTERIA: CPT

## 2024-11-27 RX ORDER — CHLORHEXIDINE GLUCONATE ORAL RINSE 1.2 MG/ML
15 SOLUTION DENTAL 2 TIMES DAILY
Status: DISCONTINUED | OUTPATIENT
Start: 2024-11-27 | End: 2024-12-05 | Stop reason: HOSPADM

## 2024-11-27 RX ORDER — ACETAMINOPHEN 325 MG/1
650 TABLET ORAL EVERY 6 HOURS PRN
Status: DISCONTINUED | OUTPATIENT
Start: 2024-11-27 | End: 2024-12-03

## 2024-11-27 RX ADMIN — ACETAMINOPHEN 650 MG: 325 TABLET, FILM COATED ORAL at 05:11

## 2024-11-27 RX ADMIN — GABAPENTIN 300 MG: 300 CAPSULE ORAL at 08:11

## 2024-11-27 RX ADMIN — OXYCODONE HYDROCHLORIDE 10 MG: 10 TABLET ORAL at 08:11

## 2024-11-27 RX ADMIN — METHOCARBAMOL 500 MG: 500 TABLET ORAL at 02:11

## 2024-11-27 RX ADMIN — BISACODYL 10 MG: 10 SUPPOSITORY RECTAL at 05:11

## 2024-11-27 RX ADMIN — PROPRANOLOL HYDROCHLORIDE 20 MG: 20 TABLET ORAL at 09:11

## 2024-11-27 RX ADMIN — METHOCARBAMOL 500 MG: 500 TABLET ORAL at 08:11

## 2024-11-27 RX ADMIN — POLYETHYLENE GLYCOL 3350 17 G: 17 POWDER, FOR SOLUTION ORAL at 09:11

## 2024-11-27 RX ADMIN — METHOCARBAMOL 500 MG: 500 TABLET ORAL at 05:11

## 2024-11-27 RX ADMIN — AMANTADINE HYDROCHLORIDE 100 MG: 50 SOLUTION ORAL at 09:11

## 2024-11-27 RX ADMIN — Medication 6 MG: at 08:11

## 2024-11-27 RX ADMIN — ACETAMINOPHEN 650 MG: 325 TABLET, FILM COATED ORAL at 08:11

## 2024-11-27 RX ADMIN — ENOXAPARIN SODIUM 40 MG: 40 INJECTION SUBCUTANEOUS at 09:11

## 2024-11-27 RX ADMIN — CHLORHEXIDINE GLUCONATE 0.12% ORAL RINSE 15 ML: 1.2 LIQUID ORAL at 09:11

## 2024-11-27 RX ADMIN — GABAPENTIN 300 MG: 300 CAPSULE ORAL at 02:11

## 2024-11-27 RX ADMIN — OXYCODONE HYDROCHLORIDE 10 MG: 10 TABLET ORAL at 12:11

## 2024-11-27 RX ADMIN — SENNOSIDES 5 ML: 8.8 LIQUID ORAL at 09:11

## 2024-11-27 RX ADMIN — ENOXAPARIN SODIUM 40 MG: 40 INJECTION SUBCUTANEOUS at 08:11

## 2024-11-27 RX ADMIN — PROPRANOLOL HYDROCHLORIDE 20 MG: 20 TABLET ORAL at 08:11

## 2024-11-27 RX ADMIN — GABAPENTIN 300 MG: 300 CAPSULE ORAL at 09:11

## 2024-11-27 RX ADMIN — OXYCODONE HYDROCHLORIDE 10 MG: 10 TABLET ORAL at 05:11

## 2024-11-27 RX ADMIN — CHLORHEXIDINE GLUCONATE 0.12% ORAL RINSE 15 ML: 1.2 LIQUID ORAL at 08:11

## 2024-11-27 RX ADMIN — OXYCODONE HYDROCHLORIDE 10 MG: 10 TABLET ORAL at 04:11
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
## 2024-11-27 NOTE — PLAN OF CARE
F/u with Micaela at [facility] and confirmed they are still following pt for  medical progression. Referral sent to administration for further review.     Kaylyn Dye LCSW

## 2024-11-27 NOTE — PT/OT/SLP PROGRESS
Occupational Therapy   Treatment    Name: Payam Grande  MRN: [id number]  Admitting Diagnosis:  SDH (subdural hematoma)  9 Days Post-Op    Recommendations:     Recommended therapy intensity at discharge: High Intensity Therapy   Discharge Equipment Recommendations:  to be determined by next level of care  Barriers to discharge:       Assessment:     Payam Grande is a 49 y.o. male with a medical diagnosis of SDH (subdural hematoma).  He presents with overall increased distraction, requiring constant redirection. Pt. Is impulsive presenting with poor safety awareness however easy to redirect. Performance deficits affecting function are weakness, impaired endurance, impaired functional mobility, impaired balance.     Rehab Prognosis:  Good; patient would benefit from acute skilled OT services to address these deficits and reach maximum level of function.       Plan:     Patient to be seen 6 x/week to address the above listed problems via self-care/home management, therapeutic activities, therapeutic exercises  Plan of Care Expires: 12/20/24  Plan of Care Reviewed with: patient    Subjective     Pain/Comfort:       Objective:     Communicated with: RN prior to session.  Patient found HOB elevated with   upon OT entry to room.    General Precautions: Standard, fall    Orthopedic Precautions:RLE weight bearing as tolerated, RUE non weight bearing (RUE NWB except for ADLs, RLE WBAT in CAM boot)  Braces:  (hinged elbow brace, CAM boot)  Respiratory Status: Room air     Occupational Performance:   (Bed Mobility- Min A)  (Sitting balance- CGA) restless EOB  Total A for donning CAM BOOT  Pt. Able to gin sock to L LE Mod I in a figure 4 position.   (Sit to stand- Mod A)  Pt. Ambulating around bed with Mod A poor step progression noted adherence given to NWB of R UE.  Therapeutic Positioning    OT interventions performed during the course of today's session in an effort to prevent and/or reduce acquired pressure injuries:    Therapeutic positioning was provided at the conclusion of session to offload all bony prominences for the prevention and/or reduction of pressure injuries      [facility] 6 Click ADL:      Patient Education:  Patient provided with verbal education education regarding fall prevention, safety awareness, and pressure ulcer prevention.  Additional teaching is warranted.      Patient left HOB elevated with all lines intact and call button in reach.    GOALS:   Multidisciplinary Problems       Occupational Therapy Goals          Problem: Occupational Therapy    Goal Priority Disciplines Outcome Interventions   Occupational Therapy Goal     OT, PT/OT Progressing    Description: LTG: Pt will perform basic ADLs and ADL transfers with Modified independence using LRAD by discharge.    STG: to be met by 12/20/24    Pt will complete grooming standing at sink with LRAD with SBA.  Pt will complete UB dressing with SBA.  Pt will complete LB dressing with SBA using LRAD.  Pt will complete toileting with SBA using LRAD.  Pt will complete functional mobility to/from toilet and toilet transfer with SBA using LRAD.                         Time Tracking:     OT Date of Treatment: 11/27/24  OT Start Time: 1315  OT Stop Time: 1334  OT Total Time (min): 19 min    Billable Minutes:Self Care/Home Management 1    OT/TIMUR: TIMUR     Number of TIMUR visits since last OT visit: 5    11/27/2024

## 2024-11-27 NOTE — PLAN OF CARE
Problem: Adult Inpatient Plan of Care  Goal: Plan of Care Review  Outcome: Progressing  Goal: Patient-Specific Goal (Individualized)  Outcome: Progressing  Goal: Absence of Hospital-Acquired Illness or Injury  Outcome: Progressing  Goal: Optimal Comfort and Wellbeing  Outcome: Progressing  Goal: Readiness for Transition of Care  Outcome: Progressing     Problem: Skin Injury Risk Increased  Goal: Skin Health and Integrity  Outcome: Progressing     Problem: Fall Injury Risk  Goal: Absence of Fall and Fall-Related Injury  Outcome: Progressing     Problem: Infection  Goal: Absence of Infection Signs and Symptoms  Outcome: Progressing     Problem: Wound  Goal: Optimal Coping  Outcome: Progressing  Goal: Optimal Functional Ability  Outcome: Progressing  Goal: Absence of Infection Signs and Symptoms  Outcome: Progressing  Goal: Improved Oral Intake  Outcome: Progressing  Goal: Optimal Pain Control and Function  Outcome: Progressing  Goal: Skin Health and Integrity  Outcome: Progressing  Goal: Optimal Wound Healing  Outcome: Progressing     Problem: Pain Acute  Goal: Optimal Pain Control and Function  Outcome: Progressing

## 2024-11-28 LAB
ALBUMIN SERPL-MCNC: 2.9 G/DL (ref 3.5–5)
ALBUMIN/GLOB SERPL: 0.8 RATIO (ref 1.1–2)
ALP SERPL-CCNC: 145 UNIT/L (ref 40–150)
ALT SERPL-CCNC: 136 UNIT/L (ref 0–55)
ANION GAP SERPL CALC-SCNC: 10 MEQ/L
ANION GAP SERPL CALC-SCNC: 12 MEQ/L
AST SERPL-CCNC: 96 UNIT/L (ref 5–34)
BASOPHILS # BLD AUTO: 0.06 X10(3)/MCL
BASOPHILS NFR BLD AUTO: 0.3 %
BILIRUB SERPL-MCNC: 0.3 MG/DL
BUN SERPL-MCNC: 17.6 MG/DL (ref 8.9–20.6)
BUN SERPL-MCNC: 21.4 MG/DL (ref 8.9–20.6)
CALCIUM SERPL-MCNC: 8.9 MG/DL (ref 8.4–10.2)
CALCIUM SERPL-MCNC: 9.2 MG/DL (ref 8.4–10.2)
CHLORIDE SERPL-SCNC: 95 MMOL/L (ref 98–107)
CHLORIDE SERPL-SCNC: 97 MMOL/L (ref 98–107)
CO2 SERPL-SCNC: 28 MMOL/L (ref 22–29)
CO2 SERPL-SCNC: 28 MMOL/L (ref 22–29)
CREAT SERPL-MCNC: 0.57 MG/DL (ref 0.72–1.25)
CREAT SERPL-MCNC: 0.58 MG/DL (ref 0.72–1.25)
CREAT/UREA NIT SERPL: 30
CREAT/UREA NIT SERPL: 38
CRP SERPL-MCNC: 71 MG/L
EOSINOPHIL # BLD AUTO: 0.37 X10(3)/MCL (ref 0–0.9)
EOSINOPHIL NFR BLD AUTO: 2 %
ERYTHROCYTE [DISTWIDTH] IN BLOOD BY AUTOMATED COUNT: 13.8 % (ref 11.5–17)
GFR SERPLBLD CREATININE-BSD FMLA CKD-EPI: >60 ML/MIN/1.73/M2
GFR SERPLBLD CREATININE-BSD FMLA CKD-EPI: >60 ML/MIN/1.73/M2
GLOBULIN SER-MCNC: 3.6 GM/DL (ref 2.4–3.5)
GLUCOSE SERPL-MCNC: 101 MG/DL (ref 74–100)
GLUCOSE SERPL-MCNC: 112 MG/DL (ref 74–100)
HCT VFR BLD AUTO: 27 % (ref 42–52)
HGB BLD-MCNC: 8.8 G/DL (ref 14–18)
IMM GRANULOCYTES # BLD AUTO: 0.55 X10(3)/MCL (ref 0–0.04)
IMM GRANULOCYTES NFR BLD AUTO: 3 %
LYMPHOCYTES # BLD AUTO: 3.17 X10(3)/MCL (ref 0.6–4.6)
LYMPHOCYTES NFR BLD AUTO: 17.1 %
MCH RBC QN AUTO: 30.6 PG (ref 27–31)
MCHC RBC AUTO-ENTMCNC: 32.6 G/DL (ref 33–36)
MCV RBC AUTO: 93.8 FL (ref 80–94)
MONOCYTES # BLD AUTO: 1.32 X10(3)/MCL (ref 0.1–1.3)
MONOCYTES NFR BLD AUTO: 7.1 %
NEUTROPHILS # BLD AUTO: 13.12 X10(3)/MCL (ref 2.1–9.2)
NEUTROPHILS NFR BLD AUTO: 70.5 %
NRBC BLD AUTO-RTO: 0.1 %
OHS QRS DURATION: 96 MS
OHS QTC CALCULATION: 421 MS
PLATELET # BLD AUTO: 1246 X10(3)/MCL (ref 130–400)
PLATELETS.RETICULATED NFR BLD AUTO: 1.7 % (ref 0.9–11.2)
PMV BLD AUTO: 8.9 FL (ref 7.4–10.4)
POCT GLUCOSE: 142 MG/DL (ref 70–110)
POCT GLUCOSE: 152 MG/DL (ref 70–110)
POCT GLUCOSE: 167 MG/DL (ref 70–110)
POTASSIUM SERPL-SCNC: 4.5 MMOL/L (ref 3.5–5.1)
POTASSIUM SERPL-SCNC: 5.7 MMOL/L (ref 3.5–5.1)
PREALB SERPL-MCNC: 21.1 MG/DL (ref 18–45)
PROT SERPL-MCNC: 6.5 GM/DL (ref 6.4–8.3)
RBC # BLD AUTO: 2.88 X10(6)/MCL (ref 4.7–6.1)
SODIUM SERPL-SCNC: 135 MMOL/L (ref 136–145)
SODIUM SERPL-SCNC: 135 MMOL/L (ref 136–145)
TROPONIN I SERPL-MCNC: 0.01 NG/ML (ref 0–0.04)
WBC # BLD AUTO: 18.59 X10(3)/MCL (ref 4.5–11.5)

## 2024-11-28 PROCEDURE — 80053 COMPREHEN METABOLIC PANEL: CPT | Performed by: NURSE PRACTITIONER

## 2024-11-28 PROCEDURE — 36415 COLL VENOUS BLD VENIPUNCTURE: CPT | Performed by: NURSE PRACTITIONER

## 2024-11-28 PROCEDURE — 21400001 HC TELEMETRY ROOM

## 2024-11-28 PROCEDURE — 25000003 PHARM REV CODE 250

## 2024-11-28 PROCEDURE — 36415 COLL VENOUS BLD VENIPUNCTURE: CPT

## 2024-11-28 PROCEDURE — 94760 N-INVAS EAR/PLS OXIMETRY 1: CPT

## 2024-11-28 PROCEDURE — 63600175 PHARM REV CODE 636 W HCPCS: Performed by: NURSE PRACTITIONER

## 2024-11-28 PROCEDURE — 25000003 PHARM REV CODE 250: Performed by: SURGERY

## 2024-11-28 PROCEDURE — 63600175 PHARM REV CODE 636 W HCPCS

## 2024-11-28 PROCEDURE — 84484 ASSAY OF TROPONIN QUANT: CPT

## 2024-11-28 PROCEDURE — 25000003 PHARM REV CODE 250: Performed by: NURSE PRACTITIONER

## 2024-11-28 PROCEDURE — 63600175 PHARM REV CODE 636 W HCPCS: Performed by: SURGERY

## 2024-11-28 PROCEDURE — 99900031 HC PATIENT EDUCATION (STAT)

## 2024-11-28 PROCEDURE — 99900035 HC TECH TIME PER 15 MIN (STAT)

## 2024-11-28 PROCEDURE — 25000242 PHARM REV CODE 250 ALT 637 W/ HCPCS

## 2024-11-28 PROCEDURE — 94640 AIRWAY INHALATION TREATMENT: CPT

## 2024-11-28 PROCEDURE — 97530 THERAPEUTIC ACTIVITIES: CPT | Mod: CQ

## 2024-11-28 PROCEDURE — 86140 C-REACTIVE PROTEIN: CPT | Performed by: NURSE PRACTITIONER

## 2024-11-28 PROCEDURE — 84134 ASSAY OF PREALBUMIN: CPT | Performed by: NURSE PRACTITIONER

## 2024-11-28 PROCEDURE — 93010 ELECTROCARDIOGRAM REPORT: CPT | Mod: ,,, | Performed by: INTERNAL MEDICINE

## 2024-11-28 PROCEDURE — 93005 ELECTROCARDIOGRAM TRACING: CPT

## 2024-11-28 PROCEDURE — 85025 COMPLETE CBC W/AUTO DIFF WBC: CPT | Performed by: NURSE PRACTITIONER

## 2024-11-28 RX ORDER — ALBUTEROL SULFATE 0.83 MG/ML
5 SOLUTION RESPIRATORY (INHALATION) ONCE
Status: COMPLETED | OUTPATIENT
Start: 2024-11-28 | End: 2024-11-28

## 2024-11-28 RX ORDER — CALCIUM GLUCONATE 20 MG/ML
1 INJECTION, SOLUTION INTRAVENOUS EVERY 10 MIN PRN
Status: DISCONTINUED | OUTPATIENT
Start: 2024-11-28 | End: 2024-12-03

## 2024-11-28 RX ORDER — FUROSEMIDE 10 MG/ML
60 INJECTION INTRAMUSCULAR; INTRAVENOUS ONCE
Status: COMPLETED | OUTPATIENT
Start: 2024-11-28 | End: 2024-11-28

## 2024-11-28 RX ORDER — ALBUTEROL SULFATE 0.83 MG/ML
10 SOLUTION RESPIRATORY (INHALATION) ONCE
Status: DISCONTINUED | OUTPATIENT
Start: 2024-11-28 | End: 2024-11-28

## 2024-11-28 RX ORDER — CALCIUM GLUCONATE 20 MG/ML
1 INJECTION, SOLUTION INTRAVENOUS ONCE
Status: COMPLETED | OUTPATIENT
Start: 2024-11-28 | End: 2024-11-28

## 2024-11-28 RX ADMIN — OXYCODONE HYDROCHLORIDE 10 MG: 10 TABLET ORAL at 06:11

## 2024-11-28 RX ADMIN — OXYCODONE HYDROCHLORIDE 10 MG: 10 TABLET ORAL at 02:11

## 2024-11-28 RX ADMIN — PROPRANOLOL HYDROCHLORIDE 20 MG: 20 TABLET ORAL at 09:11

## 2024-11-28 RX ADMIN — METHOCARBAMOL 500 MG: 500 TABLET ORAL at 09:11

## 2024-11-28 RX ADMIN — CHLORHEXIDINE GLUCONATE 0.12% ORAL RINSE 15 ML: 1.2 LIQUID ORAL at 09:11

## 2024-11-28 RX ADMIN — HUMAN INSULIN 10 UNITS: 100 INJECTION, SOLUTION SUBCUTANEOUS at 10:11

## 2024-11-28 RX ADMIN — METHOCARBAMOL 500 MG: 500 TABLET ORAL at 03:11

## 2024-11-28 RX ADMIN — GABAPENTIN 300 MG: 300 CAPSULE ORAL at 03:11

## 2024-11-28 RX ADMIN — ACETAMINOPHEN 650 MG: 325 TABLET, FILM COATED ORAL at 09:11

## 2024-11-28 RX ADMIN — AMANTADINE HYDROCHLORIDE 100 MG: 50 SOLUTION ORAL at 09:11

## 2024-11-28 RX ADMIN — Medication 6 MG: at 09:11

## 2024-11-28 RX ADMIN — SENNOSIDES 5 ML: 8.8 LIQUID ORAL at 09:11

## 2024-11-28 RX ADMIN — ALBUTEROL SULFATE 5 MG: 2.5 SOLUTION RESPIRATORY (INHALATION) at 09:11

## 2024-11-28 RX ADMIN — DEXTROSE MONOHYDRATE 500 ML: 100 INJECTION, SOLUTION INTRAVENOUS at 10:11

## 2024-11-28 RX ADMIN — CALCIUM GLUCONATE 1 G: 20 INJECTION, SOLUTION INTRAVENOUS at 12:11

## 2024-11-28 RX ADMIN — ENOXAPARIN SODIUM 40 MG: 40 INJECTION SUBCUTANEOUS at 09:11

## 2024-11-28 RX ADMIN — FUROSEMIDE 60 MG: 10 INJECTION, SOLUTION INTRAMUSCULAR; INTRAVENOUS at 10:11

## 2024-11-28 RX ADMIN — GABAPENTIN 300 MG: 300 CAPSULE ORAL at 09:11

## 2024-11-28 RX ADMIN — ZIPRASIDONE MESYLATE 20 MG: 20 INJECTION, POWDER, LYOPHILIZED, FOR SOLUTION INTRAMUSCULAR at 02:11

## 2024-11-28 RX ADMIN — METHOCARBAMOL 500 MG: 500 TABLET ORAL at 05:11

## 2024-11-28 RX ADMIN — SODIUM BICARBONATE: 84 INJECTION, SOLUTION INTRAVENOUS at 12:11

## 2024-11-28 NOTE — PLAN OF CARE
Problem: Adult Inpatient Plan of Care  Goal: Optimal Comfort and Wellbeing  Outcome: Progressing  Intervention: Monitor Pain and Promote Comfort  Flowsheets (Taken 11/28/2024 1352)  Pain Management Interventions:   pillow support provided   position adjusted   relaxation techniques promoted   quiet environment facilitated   pain management plan reviewed with patient/caregiver   medication offered but refused  Intervention: Provide Person-Centered Care  Flowsheets (Taken 11/28/2024 1352)  Trust Relationship/Rapport:   care explained   questions encouraged   choices provided   reassurance provided   emotional support provided   thoughts/feelings acknowledged   empathic listening provided   questions answered     Problem: Skin Injury Risk Increased  Goal: Skin Health and Integrity  Outcome: Progressing  Intervention: Optimize Skin Protection  Flowsheets (Taken 11/28/2024 1352)  Pressure Reduction Techniques:   frequent weight shift encouraged   pressure points protected  Pressure Reduction Devices: positioning supports utilized  Skin Protection: incontinence pads utilized  Activity Management: Rolling - L1  Head of Bed (HOB) Positioning: HOB at 30-45 degrees     Problem: Wound  Goal: Optimal Functional Ability  Outcome: Progressing  Intervention: Optimize Functional Ability  Flowsheets (Taken 11/28/2024 1352)  Activity Management: Rolling - L1  Activity Assistance Provided: independent     Problem: Pain Acute  Goal: Optimal Pain Control and Function  Outcome: Progressing  Intervention: Develop Pain Management Plan  Flowsheets (Taken 11/28/2024 1352)  Pain Management Interventions:   pillow support provided   position adjusted   relaxation techniques promoted   quiet environment facilitated   pain management plan reviewed with patient/caregiver   medication offered but refused  Intervention: Prevent or Manage Pain  Flowsheets (Taken 11/28/2024 1352)  Sleep/Rest Enhancement:   therapeutic touch utilized   relaxation  techniques promoted   natural light exposure provided   regular sleep/rest pattern promoted  Medication Review/Management: medications reviewed  Intervention: Optimize Psychosocial Wellbeing  Flowsheets (Taken 11/28/2024 6473)  Spiritual Activities Assistance: hope instilled  Supportive Measures:   active listening utilized   self-care encouraged   relaxation techniques promoted   verbalization of feelings encouraged
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
## 2024-11-29 LAB
BASOPHILS # BLD AUTO: 0.09 X10(3)/MCL
BASOPHILS NFR BLD AUTO: 0.4 %
EOSINOPHIL # BLD AUTO: 0.35 X10(3)/MCL (ref 0–0.9)
EOSINOPHIL NFR BLD AUTO: 1.5 %
ERYTHROCYTE [DISTWIDTH] IN BLOOD BY AUTOMATED COUNT: 13.7 % (ref 11.5–17)
HCT VFR BLD AUTO: 26.6 % (ref 42–52)
HEMATOLOGIST REVIEW: NORMAL
HGB BLD-MCNC: 8.7 G/DL (ref 14–18)
IMM GRANULOCYTES # BLD AUTO: 0.36 X10(3)/MCL (ref 0–0.04)
IMM GRANULOCYTES NFR BLD AUTO: 1.6 %
LYMPHOCYTES # BLD AUTO: 3.22 X10(3)/MCL (ref 0.6–4.6)
LYMPHOCYTES NFR BLD AUTO: 14.2 %
MCH RBC QN AUTO: 30 PG (ref 27–31)
MCHC RBC AUTO-ENTMCNC: 32.7 G/DL (ref 33–36)
MCV RBC AUTO: 91.7 FL (ref 80–94)
MONOCYTES # BLD AUTO: 1.17 X10(3)/MCL (ref 0.1–1.3)
MONOCYTES NFR BLD AUTO: 5.1 %
NEUTROPHILS # BLD AUTO: 17.55 X10(3)/MCL (ref 2.1–9.2)
NEUTROPHILS NFR BLD AUTO: 77.2 %
NRBC BLD AUTO-RTO: 0 %
PLATELET # BLD AUTO: 1303 X10(3)/MCL (ref 130–400)
PLATELET # BLD EST: ABNORMAL 10*3/UL
PLATELETS.RETICULATED NFR BLD AUTO: 1.7 % (ref 0.9–11.2)
PMV BLD AUTO: 9.1 FL (ref 7.4–10.4)
RBC # BLD AUTO: 2.9 X10(6)/MCL (ref 4.7–6.1)
RBC MORPH BLD: NORMAL
WBC # BLD AUTO: 22.74 X10(3)/MCL (ref 4.5–11.5)

## 2024-11-29 PROCEDURE — 63600175 PHARM REV CODE 636 W HCPCS: Performed by: SURGERY

## 2024-11-29 PROCEDURE — 25000003 PHARM REV CODE 250

## 2024-11-29 PROCEDURE — 25000003 PHARM REV CODE 250: Performed by: NURSE PRACTITIONER

## 2024-11-29 PROCEDURE — 25500020 PHARM REV CODE 255: Performed by: SURGERY

## 2024-11-29 PROCEDURE — 85025 COMPLETE CBC W/AUTO DIFF WBC: CPT

## 2024-11-29 PROCEDURE — 21400001 HC TELEMETRY ROOM

## 2024-11-29 PROCEDURE — 97116 GAIT TRAINING THERAPY: CPT | Mod: CQ

## 2024-11-29 PROCEDURE — 25000003 PHARM REV CODE 250: Performed by: SURGERY

## 2024-11-29 PROCEDURE — 97168 OT RE-EVAL EST PLAN CARE: CPT

## 2024-11-29 PROCEDURE — 63600175 PHARM REV CODE 636 W HCPCS: Performed by: NURSE PRACTITIONER

## 2024-11-29 PROCEDURE — 94799 UNLISTED PULMONARY SVC/PX: CPT

## 2024-11-29 PROCEDURE — 36415 COLL VENOUS BLD VENIPUNCTURE: CPT

## 2024-11-29 PROCEDURE — 99900035 HC TECH TIME PER 15 MIN (STAT)

## 2024-11-29 RX ORDER — NAPROXEN SODIUM 220 MG/1
81 TABLET, FILM COATED ORAL DAILY
Status: DISCONTINUED | OUTPATIENT
Start: 2024-12-02 | End: 2024-12-03

## 2024-11-29 RX ADMIN — METHOCARBAMOL 500 MG: 500 TABLET ORAL at 09:11

## 2024-11-29 RX ADMIN — OXYCODONE HYDROCHLORIDE 10 MG: 10 TABLET ORAL at 03:11

## 2024-11-29 RX ADMIN — AMANTADINE HYDROCHLORIDE 100 MG: 50 SOLUTION ORAL at 09:11

## 2024-11-29 RX ADMIN — GABAPENTIN 300 MG: 300 CAPSULE ORAL at 09:11

## 2024-11-29 RX ADMIN — ENOXAPARIN SODIUM 40 MG: 40 INJECTION SUBCUTANEOUS at 09:11

## 2024-11-29 RX ADMIN — POLYETHYLENE GLYCOL 3350 17 G: 17 POWDER, FOR SOLUTION ORAL at 09:11

## 2024-11-29 RX ADMIN — OXYCODONE HYDROCHLORIDE 10 MG: 10 TABLET ORAL at 09:11

## 2024-11-29 RX ADMIN — OXYCODONE HYDROCHLORIDE 10 MG: 10 TABLET ORAL at 01:11

## 2024-11-29 RX ADMIN — METHOCARBAMOL 500 MG: 500 TABLET ORAL at 01:11

## 2024-11-29 RX ADMIN — METHOCARBAMOL 500 MG: 500 TABLET ORAL at 05:11

## 2024-11-29 RX ADMIN — OXYCODONE HYDROCHLORIDE 10 MG: 10 TABLET ORAL at 12:11

## 2024-11-29 RX ADMIN — ZIPRASIDONE MESYLATE 20 MG: 20 INJECTION, POWDER, LYOPHILIZED, FOR SOLUTION INTRAMUSCULAR at 09:11

## 2024-11-29 RX ADMIN — IOHEXOL 100 ML: 350 INJECTION, SOLUTION INTRAVENOUS at 11:11

## 2024-11-29 RX ADMIN — GABAPENTIN 300 MG: 300 CAPSULE ORAL at 02:11

## 2024-11-29 RX ADMIN — SENNOSIDES 5 ML: 8.8 LIQUID ORAL at 09:11

## 2024-11-29 RX ADMIN — PROPRANOLOL HYDROCHLORIDE 20 MG: 20 TABLET ORAL at 09:11

## 2024-11-29 RX ADMIN — CHLORHEXIDINE GLUCONATE 0.12% ORAL RINSE 15 ML: 1.2 LIQUID ORAL at 09:11

## 2024-11-29 NOTE — PLAN OF CARE
Problem: Occupational Therapy  Goal: Occupational Therapy Goal  Description: LTG: Pt will perform basic ADLs and ADL transfers with Modified independence using LRAD by discharge.    STG: to be met by 12/20/24    Pt will complete grooming standing at sink with LRAD with SBA.  Pt will complete UB dressing with SBA.  Pt will complete LB dressing with SBA using LRAD.  Pt will complete toileting with SBA using LRAD.  Pt will complete functional mobility to/from toilet and toilet transfer with SBA using LRAD.    Outcome: Not Progressing
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
## 2024-11-30 LAB
ALBUMIN SERPL-MCNC: 3.1 G/DL (ref 3.5–5)
ALBUMIN/GLOB SERPL: 0.8 RATIO (ref 1.1–2)
ALP SERPL-CCNC: 146 UNIT/L (ref 40–150)
ALT SERPL-CCNC: 93 UNIT/L (ref 0–55)
ANION GAP SERPL CALC-SCNC: 11 MEQ/L
AST SERPL-CCNC: 69 UNIT/L (ref 5–34)
BASOPHILS # BLD AUTO: 0.06 X10(3)/MCL
BASOPHILS NFR BLD AUTO: 0.3 %
BILIRUB SERPL-MCNC: 0.3 MG/DL
BUN SERPL-MCNC: 23.1 MG/DL (ref 8.9–20.6)
CALCIUM SERPL-MCNC: 9 MG/DL (ref 8.4–10.2)
CHLORIDE SERPL-SCNC: 93 MMOL/L (ref 98–107)
CO2 SERPL-SCNC: 34 MMOL/L (ref 22–29)
CREAT SERPL-MCNC: 0.68 MG/DL (ref 0.72–1.25)
CREAT/UREA NIT SERPL: 34
EOSINOPHIL # BLD AUTO: 0.32 X10(3)/MCL (ref 0–0.9)
EOSINOPHIL NFR BLD AUTO: 1.7 %
ERYTHROCYTE [DISTWIDTH] IN BLOOD BY AUTOMATED COUNT: 13.9 % (ref 11.5–17)
GFR SERPLBLD CREATININE-BSD FMLA CKD-EPI: >60 ML/MIN/1.73/M2
GLOBULIN SER-MCNC: 3.8 GM/DL (ref 2.4–3.5)
GLUCOSE SERPL-MCNC: 100 MG/DL (ref 74–100)
HCT VFR BLD AUTO: 26.8 % (ref 42–52)
HGB BLD-MCNC: 8.7 G/DL (ref 14–18)
IMM GRANULOCYTES # BLD AUTO: 0.22 X10(3)/MCL (ref 0–0.04)
IMM GRANULOCYTES NFR BLD AUTO: 1.2 %
LYMPHOCYTES # BLD AUTO: 2.64 X10(3)/MCL (ref 0.6–4.6)
LYMPHOCYTES NFR BLD AUTO: 14 %
MCH RBC QN AUTO: 30.3 PG (ref 27–31)
MCHC RBC AUTO-ENTMCNC: 32.5 G/DL (ref 33–36)
MCV RBC AUTO: 93.4 FL (ref 80–94)
MONOCYTES # BLD AUTO: 1.26 X10(3)/MCL (ref 0.1–1.3)
MONOCYTES NFR BLD AUTO: 6.7 %
NEUTROPHILS # BLD AUTO: 14.31 X10(3)/MCL (ref 2.1–9.2)
NEUTROPHILS NFR BLD AUTO: 76.1 %
NRBC BLD AUTO-RTO: 0 %
PLATELET # BLD AUTO: 1530 X10(3)/MCL (ref 130–400)
PLATELETS.RETICULATED NFR BLD AUTO: 1.5 % (ref 0.9–11.2)
PMV BLD AUTO: 8.5 FL (ref 7.4–10.4)
POCT GLUCOSE: 101 MG/DL (ref 70–110)
POCT GLUCOSE: 115 MG/DL (ref 70–110)
POCT GLUCOSE: 122 MG/DL (ref 70–110)
POTASSIUM SERPL-SCNC: 5.1 MMOL/L (ref 3.5–5.1)
PROT SERPL-MCNC: 6.9 GM/DL (ref 6.4–8.3)
RBC # BLD AUTO: 2.87 X10(6)/MCL (ref 4.7–6.1)
SODIUM SERPL-SCNC: 138 MMOL/L (ref 136–145)
WBC # BLD AUTO: 18.81 X10(3)/MCL (ref 4.5–11.5)

## 2024-11-30 PROCEDURE — 25000003 PHARM REV CODE 250: Performed by: NURSE PRACTITIONER

## 2024-11-30 PROCEDURE — 25000003 PHARM REV CODE 250: Performed by: SURGERY

## 2024-11-30 PROCEDURE — 25000003 PHARM REV CODE 250

## 2024-11-30 PROCEDURE — 63600175 PHARM REV CODE 636 W HCPCS: Performed by: NURSE PRACTITIONER

## 2024-11-30 PROCEDURE — 80053 COMPREHEN METABOLIC PANEL: CPT

## 2024-11-30 PROCEDURE — 21400001 HC TELEMETRY ROOM

## 2024-11-30 PROCEDURE — 85025 COMPLETE CBC W/AUTO DIFF WBC: CPT

## 2024-11-30 PROCEDURE — 63600175 PHARM REV CODE 636 W HCPCS: Performed by: SURGERY

## 2024-11-30 PROCEDURE — 36415 COLL VENOUS BLD VENIPUNCTURE: CPT

## 2024-11-30 RX ADMIN — ENOXAPARIN SODIUM 40 MG: 40 INJECTION SUBCUTANEOUS at 08:11

## 2024-11-30 RX ADMIN — CHLORHEXIDINE GLUCONATE 0.12% ORAL RINSE 15 ML: 1.2 LIQUID ORAL at 08:11

## 2024-11-30 RX ADMIN — PROPRANOLOL HYDROCHLORIDE 20 MG: 20 TABLET ORAL at 08:11

## 2024-11-30 RX ADMIN — BISACODYL 10 MG: 10 SUPPOSITORY RECTAL at 07:11

## 2024-11-30 RX ADMIN — GABAPENTIN 300 MG: 300 CAPSULE ORAL at 08:11

## 2024-11-30 RX ADMIN — POLYETHYLENE GLYCOL 3350 17 G: 17 POWDER, FOR SOLUTION ORAL at 08:11

## 2024-11-30 RX ADMIN — GABAPENTIN 300 MG: 300 CAPSULE ORAL at 02:11

## 2024-11-30 RX ADMIN — METHOCARBAMOL 500 MG: 500 TABLET ORAL at 02:11

## 2024-11-30 RX ADMIN — ZIPRASIDONE MESYLATE 20 MG: 20 INJECTION, POWDER, LYOPHILIZED, FOR SOLUTION INTRAMUSCULAR at 02:11

## 2024-11-30 RX ADMIN — METHOCARBAMOL 500 MG: 500 TABLET ORAL at 09:11

## 2024-11-30 RX ADMIN — SENNOSIDES 5 ML: 8.8 LIQUID ORAL at 08:11

## 2024-11-30 RX ADMIN — AMANTADINE HYDROCHLORIDE 100 MG: 50 SOLUTION ORAL at 08:11

## 2024-11-30 RX ADMIN — METHOCARBAMOL 500 MG: 500 TABLET ORAL at 05:11
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
## 2024-12-01 LAB
ALBUMIN SERPL-MCNC: 3.1 G/DL (ref 3.5–5)
ALBUMIN/GLOB SERPL: 0.8 RATIO (ref 1.1–2)
ALP SERPL-CCNC: 150 UNIT/L (ref 40–150)
ALT SERPL-CCNC: 77 UNIT/L (ref 0–55)
ANION GAP SERPL CALC-SCNC: 9 MEQ/L
AST SERPL-CCNC: 63 UNIT/L (ref 5–34)
BASOPHILS # BLD AUTO: 0.07 X10(3)/MCL
BASOPHILS NFR BLD AUTO: 0.4 %
BILIRUB SERPL-MCNC: 0.3 MG/DL
BUN SERPL-MCNC: 25.5 MG/DL (ref 8.9–20.6)
CALCIUM SERPL-MCNC: 9.5 MG/DL (ref 8.4–10.2)
CHLORIDE SERPL-SCNC: 99 MMOL/L (ref 98–107)
CO2 SERPL-SCNC: 31 MMOL/L (ref 22–29)
CREAT SERPL-MCNC: 0.64 MG/DL (ref 0.72–1.25)
CREAT/UREA NIT SERPL: 40
EOSINOPHIL # BLD AUTO: 0.23 X10(3)/MCL (ref 0–0.9)
EOSINOPHIL NFR BLD AUTO: 1.2 %
ERYTHROCYTE [DISTWIDTH] IN BLOOD BY AUTOMATED COUNT: 14.1 % (ref 11.5–17)
GFR SERPLBLD CREATININE-BSD FMLA CKD-EPI: >60 ML/MIN/1.73/M2
GLOBULIN SER-MCNC: 3.9 GM/DL (ref 2.4–3.5)
GLUCOSE SERPL-MCNC: 119 MG/DL (ref 74–100)
HCT VFR BLD AUTO: 26.5 % (ref 42–52)
HGB BLD-MCNC: 8.7 G/DL (ref 14–18)
IMM GRANULOCYTES # BLD AUTO: 0.2 X10(3)/MCL (ref 0–0.04)
IMM GRANULOCYTES NFR BLD AUTO: 1.1 %
LYMPHOCYTES # BLD AUTO: 2.6 X10(3)/MCL (ref 0.6–4.6)
LYMPHOCYTES NFR BLD AUTO: 13.8 %
MCH RBC QN AUTO: 30.2 PG (ref 27–31)
MCHC RBC AUTO-ENTMCNC: 32.8 G/DL (ref 33–36)
MCV RBC AUTO: 92 FL (ref 80–94)
MONOCYTES # BLD AUTO: 1.4 X10(3)/MCL (ref 0.1–1.3)
MONOCYTES NFR BLD AUTO: 7.4 %
NEUTROPHILS # BLD AUTO: 14.33 X10(3)/MCL (ref 2.1–9.2)
NEUTROPHILS NFR BLD AUTO: 76.1 %
NRBC BLD AUTO-RTO: 0 %
PLATELET # BLD AUTO: 1519 X10(3)/MCL (ref 130–400)
PLATELETS.RETICULATED NFR BLD AUTO: 1.1 % (ref 0.9–11.2)
PMV BLD AUTO: 8.7 FL (ref 7.4–10.4)
POTASSIUM SERPL-SCNC: 5.1 MMOL/L (ref 3.5–5.1)
PROT SERPL-MCNC: 7 GM/DL (ref 6.4–8.3)
RBC # BLD AUTO: 2.88 X10(6)/MCL (ref 4.7–6.1)
SODIUM SERPL-SCNC: 139 MMOL/L (ref 136–145)
WBC # BLD AUTO: 18.83 X10(3)/MCL (ref 4.5–11.5)

## 2024-12-01 PROCEDURE — 99900035 HC TECH TIME PER 15 MIN (STAT)

## 2024-12-01 PROCEDURE — 25000003 PHARM REV CODE 250

## 2024-12-01 PROCEDURE — 85025 COMPLETE CBC W/AUTO DIFF WBC: CPT

## 2024-12-01 PROCEDURE — 63600175 PHARM REV CODE 636 W HCPCS: Performed by: SURGERY

## 2024-12-01 PROCEDURE — 63600175 PHARM REV CODE 636 W HCPCS: Performed by: NURSE PRACTITIONER

## 2024-12-01 PROCEDURE — 25000003 PHARM REV CODE 250: Performed by: SURGERY

## 2024-12-01 PROCEDURE — 21400001 HC TELEMETRY ROOM

## 2024-12-01 PROCEDURE — 25000003 PHARM REV CODE 250: Performed by: NURSE PRACTITIONER

## 2024-12-01 PROCEDURE — 97116 GAIT TRAINING THERAPY: CPT | Mod: CQ

## 2024-12-01 PROCEDURE — 94799 UNLISTED PULMONARY SVC/PX: CPT | Mod: XB

## 2024-12-01 PROCEDURE — 36415 COLL VENOUS BLD VENIPUNCTURE: CPT

## 2024-12-01 PROCEDURE — 80053 COMPREHEN METABOLIC PANEL: CPT

## 2024-12-01 RX ADMIN — OXYCODONE HYDROCHLORIDE 10 MG: 10 TABLET ORAL at 03:12

## 2024-12-01 RX ADMIN — CHLORHEXIDINE GLUCONATE 0.12% ORAL RINSE 15 ML: 1.2 LIQUID ORAL at 08:12

## 2024-12-01 RX ADMIN — POLYETHYLENE GLYCOL 3350 17 G: 17 POWDER, FOR SOLUTION ORAL at 08:12

## 2024-12-01 RX ADMIN — ENOXAPARIN SODIUM 40 MG: 40 INJECTION SUBCUTANEOUS at 08:12

## 2024-12-01 RX ADMIN — SENNOSIDES 5 ML: 8.8 LIQUID ORAL at 08:12

## 2024-12-01 RX ADMIN — PROPRANOLOL HYDROCHLORIDE 20 MG: 20 TABLET ORAL at 08:12

## 2024-12-01 RX ADMIN — GABAPENTIN 300 MG: 300 CAPSULE ORAL at 08:12

## 2024-12-01 RX ADMIN — METHOCARBAMOL 500 MG: 500 TABLET ORAL at 05:12

## 2024-12-01 RX ADMIN — METHOCARBAMOL 500 MG: 500 TABLET ORAL at 09:12

## 2024-12-01 RX ADMIN — ZIPRASIDONE MESYLATE 20 MG: 20 INJECTION, POWDER, LYOPHILIZED, FOR SOLUTION INTRAMUSCULAR at 01:12

## 2024-12-01 RX ADMIN — AMANTADINE HYDROCHLORIDE 100 MG: 50 SOLUTION ORAL at 08:12
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
## 2024-12-02 LAB
ALBUMIN SERPL-MCNC: 3 G/DL (ref 3.5–5)
ALBUMIN/GLOB SERPL: 0.8 RATIO (ref 1.1–2)
ALP SERPL-CCNC: 144 UNIT/L (ref 40–150)
ALT SERPL-CCNC: 63 UNIT/L (ref 0–55)
ANION GAP SERPL CALC-SCNC: 9 MEQ/L
AST SERPL-CCNC: 51 UNIT/L (ref 5–34)
BACTERIA BLD CULT: NORMAL
BACTERIA BLD CULT: NORMAL
BASOPHILS # BLD AUTO: 0.08 X10(3)/MCL
BASOPHILS NFR BLD AUTO: 0.5 %
BILIRUB SERPL-MCNC: 0.2 MG/DL
BUN SERPL-MCNC: 27.7 MG/DL (ref 8.9–20.6)
CALCIUM SERPL-MCNC: 9.1 MG/DL (ref 8.4–10.2)
CHLORIDE SERPL-SCNC: 101 MMOL/L (ref 98–107)
CO2 SERPL-SCNC: 31 MMOL/L (ref 22–29)
CREAT SERPL-MCNC: 0.69 MG/DL (ref 0.72–1.25)
CREAT/UREA NIT SERPL: 40
CRP SERPL-MCNC: 23 MG/L
EOSINOPHIL # BLD AUTO: 0.27 X10(3)/MCL (ref 0–0.9)
EOSINOPHIL NFR BLD AUTO: 1.8 %
ERYTHROCYTE [DISTWIDTH] IN BLOOD BY AUTOMATED COUNT: 14.1 % (ref 11.5–17)
GFR SERPLBLD CREATININE-BSD FMLA CKD-EPI: >60 ML/MIN/1.73/M2
GLOBULIN SER-MCNC: 3.7 GM/DL (ref 2.4–3.5)
GLUCOSE SERPL-MCNC: 105 MG/DL (ref 74–100)
HCT VFR BLD AUTO: 27.3 % (ref 42–52)
HGB BLD-MCNC: 8.8 G/DL (ref 14–18)
IMM GRANULOCYTES # BLD AUTO: 0.09 X10(3)/MCL (ref 0–0.04)
IMM GRANULOCYTES NFR BLD AUTO: 0.6 %
LYMPHOCYTES # BLD AUTO: 2.5 X10(3)/MCL (ref 0.6–4.6)
LYMPHOCYTES NFR BLD AUTO: 16.7 %
MCH RBC QN AUTO: 30 PG (ref 27–31)
MCHC RBC AUTO-ENTMCNC: 32.2 G/DL (ref 33–36)
MCV RBC AUTO: 93.2 FL (ref 80–94)
MONOCYTES # BLD AUTO: 1.34 X10(3)/MCL (ref 0.1–1.3)
MONOCYTES NFR BLD AUTO: 9 %
NEUTROPHILS # BLD AUTO: 10.67 X10(3)/MCL (ref 2.1–9.2)
NEUTROPHILS NFR BLD AUTO: 71.4 %
NRBC BLD AUTO-RTO: 0 %
PLATELET # BLD AUTO: 1544 X10(3)/MCL (ref 130–400)
PLATELETS.RETICULATED NFR BLD AUTO: 1 % (ref 0.9–11.2)
PMV BLD AUTO: 8.7 FL (ref 7.4–10.4)
POCT GLUCOSE: 127 MG/DL (ref 70–110)
POTASSIUM SERPL-SCNC: 5 MMOL/L (ref 3.5–5.1)
PREALB SERPL-MCNC: 28.2 MG/DL (ref 18–45)
PROT SERPL-MCNC: 6.7 GM/DL (ref 6.4–8.3)
RBC # BLD AUTO: 2.93 X10(6)/MCL (ref 4.7–6.1)
SODIUM SERPL-SCNC: 141 MMOL/L (ref 136–145)
WBC # BLD AUTO: 14.95 X10(3)/MCL (ref 4.5–11.5)

## 2024-12-02 PROCEDURE — 92611 MOTION FLUOROSCOPY/SWALLOW: CPT

## 2024-12-02 PROCEDURE — 63600175 PHARM REV CODE 636 W HCPCS: Performed by: SURGERY

## 2024-12-02 PROCEDURE — 99233 SBSQ HOSP IP/OBS HIGH 50: CPT | Mod: ,,, | Performed by: SURGERY

## 2024-12-02 PROCEDURE — 25000003 PHARM REV CODE 250: Performed by: NURSE PRACTITIONER

## 2024-12-02 PROCEDURE — 84134 ASSAY OF PREALBUMIN: CPT | Performed by: NURSE PRACTITIONER

## 2024-12-02 PROCEDURE — 25000003 PHARM REV CODE 250

## 2024-12-02 PROCEDURE — 21400001 HC TELEMETRY ROOM

## 2024-12-02 PROCEDURE — 85025 COMPLETE CBC W/AUTO DIFF WBC: CPT

## 2024-12-02 PROCEDURE — 97535 SELF CARE MNGMENT TRAINING: CPT | Mod: CO

## 2024-12-02 PROCEDURE — 25000003 PHARM REV CODE 250: Performed by: SURGERY

## 2024-12-02 PROCEDURE — 99900031 HC PATIENT EDUCATION (STAT)

## 2024-12-02 PROCEDURE — 94799 UNLISTED PULMONARY SVC/PX: CPT

## 2024-12-02 PROCEDURE — 86140 C-REACTIVE PROTEIN: CPT | Performed by: NURSE PRACTITIONER

## 2024-12-02 PROCEDURE — 36415 COLL VENOUS BLD VENIPUNCTURE: CPT

## 2024-12-02 PROCEDURE — 97116 GAIT TRAINING THERAPY: CPT | Mod: CQ

## 2024-12-02 PROCEDURE — 80053 COMPREHEN METABOLIC PANEL: CPT

## 2024-12-02 RX ADMIN — POLYETHYLENE GLYCOL 3350 17 G: 17 POWDER, FOR SOLUTION ORAL at 08:12

## 2024-12-02 RX ADMIN — ENOXAPARIN SODIUM 40 MG: 40 INJECTION SUBCUTANEOUS at 10:12

## 2024-12-02 RX ADMIN — PROPRANOLOL HYDROCHLORIDE 20 MG: 20 TABLET ORAL at 10:12

## 2024-12-02 RX ADMIN — SENNOSIDES 5 ML: 8.8 LIQUID ORAL at 08:12

## 2024-12-02 RX ADMIN — CHLORHEXIDINE GLUCONATE 0.12% ORAL RINSE 15 ML: 1.2 LIQUID ORAL at 10:12

## 2024-12-02 RX ADMIN — ENOXAPARIN SODIUM 40 MG: 40 INJECTION SUBCUTANEOUS at 08:12

## 2024-12-02 RX ADMIN — AMANTADINE HYDROCHLORIDE 100 MG: 50 SOLUTION ORAL at 08:12

## 2024-12-02 RX ADMIN — GABAPENTIN 300 MG: 300 CAPSULE ORAL at 08:12

## 2024-12-02 RX ADMIN — METHOCARBAMOL 500 MG: 500 TABLET ORAL at 04:12

## 2024-12-02 RX ADMIN — POLYETHYLENE GLYCOL 3350 17 G: 17 POWDER, FOR SOLUTION ORAL at 10:12

## 2024-12-02 RX ADMIN — GABAPENTIN 300 MG: 300 CAPSULE ORAL at 10:12

## 2024-12-02 RX ADMIN — METHOCARBAMOL 500 MG: 500 TABLET ORAL at 10:12

## 2024-12-02 RX ADMIN — Medication 6 MG: at 10:12

## 2024-12-02 RX ADMIN — METHOCARBAMOL 500 MG: 500 TABLET ORAL at 05:12

## 2024-12-02 RX ADMIN — CHLORHEXIDINE GLUCONATE 0.12% ORAL RINSE 15 ML: 1.2 LIQUID ORAL at 08:12

## 2024-12-02 RX ADMIN — GABAPENTIN 300 MG: 300 CAPSULE ORAL at 04:12

## 2024-12-02 RX ADMIN — ASPIRIN 81 MG CHEWABLE TABLET 81 MG: 81 TABLET CHEWABLE at 08:12

## 2024-12-02 RX ADMIN — PROPRANOLOL HYDROCHLORIDE 20 MG: 20 TABLET ORAL at 08:12
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
## 2024-12-02 NOTE — PT/OT/SLP PROGRESS
Occupational Therapy   Treatment    Name: Payam Grande  MRN: [id number]  Admitting Diagnosis:  SDH (subdural hematoma)  14 Days Post-Op    Recommendations:     Recommended therapy intensity at discharge: High Intensity Therapy   Discharge Equipment Recommendations:  to be determined by next level of care  Barriers to discharge:       Assessment:     Payam Grande is a 49 y.o. male with a medical diagnosis of SDH (subdural hematoma).  He presents with poor safety awareness and impulsivity however easy to redirect. Pt. Is a high fall risk recommending High intensity therapy pending progress Performance deficits affecting function are weakness, impaired endurance, impaired functional mobility, impaired balance.     Rehab Prognosis:  Good; patient would benefit from acute skilled OT services to address these deficits and reach maximum level of function.       Plan:     Patient to be seen 6 x/week to address the above listed problems via self-care/home management, therapeutic activities, therapeutic exercises  Plan of Care Expires: 12/20/24  Plan of Care Reviewed with: patient    Subjective     Pain/Comfort:       Objective:     Communicated with: RN prior to session.  Patient found HOB elevated with   upon OT entry to room.    General Precautions: Standard, fall    Orthopedic Precautions:RLE weight bearing as tolerated, RUE non weight bearing (RUE NWB except for ADLs, RLE WBAT in CAM boot)  Braces:  (hinged elbow brace, CAM boot)  Respiratory Status: Room air       Occupational Performance:   (Bed Mobility- Mod A)  (Sitting balance- CGA)  Total A for donning CAM Boot  Constant cueing required for adherence to WB pxns of R UE.  Pt. Performing grooming task (oral hygiene/combing hair/ applying deodorant) appropriately and on command.   (Sit to stand- Mod A)  Pt. Requiring Mod A taking side steps to HOB, poor weight shifting.   Pt. Laid back down and repositioned in bed appropriately with all needs within  reach.  Therapeutic Positioning    OT interventions performed during the course of today's session in an effort to prevent and/or reduce acquired pressure injuries:   Therapeutic positioning was provided at the conclusion of session to offload all bony prominences for the prevention and/or reduction of pressure injuries      [facility] 6 Click ADL:      Patient Education:  Patient provided with verbal education education regarding fall prevention, safety awareness, and pressure ulcer prevention.  Additional teaching is warranted.      Patient left HOB elevated with all lines intact and call button in reach.    GOALS:   Multidisciplinary Problems       Occupational Therapy Goals          Problem: Occupational Therapy    Goal Priority Disciplines Outcome Interventions   Occupational Therapy Goal     OT, PT/OT Not Progressing    Description: LTG: Pt will perform basic ADLs and ADL transfers with Modified independence using LRAD by discharge.    STG: to be met by 12/20/24    Pt will complete grooming standing at sink with LRAD with SBA.  Pt will complete UB dressing with SBA.  Pt will complete LB dressing with SBA using LRAD.  Pt will complete toileting with SBA using LRAD.  Pt will complete functional mobility to/from toilet and toilet transfer with SBA using LRAD.                         Time Tracking:     OT Date of Treatment: 12/02/24  OT Start Time: 0844  OT Stop Time: 0907  OT Total Time (min): 23 min    Billable Minutes:Self Care/Home Management 2    OT/TIMUR: TIMUR     Number of TIMUR visits since last OT visit: 1 12/2/2024
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
## 2024-12-02 NOTE — PLAN OF CARE
F/u with Kaya at [facility] and confirmed they are still following pt for neuro rehab placement. Main barrier at this time is pt's NGT. SLP planning for repeat swallow study today.     Kaylyn Dye LCSW

## 2024-12-02 NOTE — PLAN OF CARE
Problem: SLP  Goal: SLP Goal  Description:   LTG: Tolerate least restrictive PO diet with no clinical signs/sx aspiration - progressing  STGs:  1.  Tolerate thermal stimulation to the anterior faucial pillars with 100% effortful swallow responses and delay less than 2 seconds - continue  2.  Complete base of tongue and laryngeal strengthening exercises with minimal cues - continue  3.  Pt will tolerate 2oz of ice chips by spoon with no clinical signs/sx aspiration - continue    Outcome: Progressing

## 2024-12-03 PROBLEM — J96.01 ACUTE RESPIRATORY FAILURE WITH HYPOXIA: Status: RESOLVED | Noted: 2024-11-19 | Resolved: 2024-12-03

## 2024-12-03 PROBLEM — R13.10 DYSPHAGIA: Status: ACTIVE | Noted: 2024-12-03

## 2024-12-03 LAB
ALBUMIN SERPL-MCNC: 3 G/DL (ref 3.5–5)
ALBUMIN/GLOB SERPL: 0.7 RATIO (ref 1.1–2)
ALP SERPL-CCNC: 145 UNIT/L (ref 40–150)
ALT SERPL-CCNC: 44 UNIT/L (ref 0–55)
ANION GAP SERPL CALC-SCNC: 13 MEQ/L
AST SERPL-CCNC: 46 UNIT/L (ref 5–34)
BASOPHILS # BLD AUTO: 0.05 X10(3)/MCL
BASOPHILS NFR BLD AUTO: 0.4 %
BILIRUB SERPL-MCNC: 0.3 MG/DL
BUN SERPL-MCNC: 29.7 MG/DL (ref 8.9–20.6)
CALCIUM SERPL-MCNC: 9.8 MG/DL (ref 8.4–10.2)
CHLORIDE SERPL-SCNC: 100 MMOL/L (ref 98–107)
CO2 SERPL-SCNC: 29 MMOL/L (ref 22–29)
CREAT SERPL-MCNC: 0.67 MG/DL (ref 0.72–1.25)
CREAT/UREA NIT SERPL: 44
EOSINOPHIL # BLD AUTO: 0.14 X10(3)/MCL (ref 0–0.9)
EOSINOPHIL NFR BLD AUTO: 1.1 %
ERYTHROCYTE [DISTWIDTH] IN BLOOD BY AUTOMATED COUNT: 14 % (ref 11.5–17)
GFR SERPLBLD CREATININE-BSD FMLA CKD-EPI: >60 ML/MIN/1.73/M2
GLOBULIN SER-MCNC: 4.3 GM/DL (ref 2.4–3.5)
GLUCOSE SERPL-MCNC: 82 MG/DL (ref 74–100)
HCT VFR BLD AUTO: 30.6 % (ref 42–52)
HGB BLD-MCNC: 9.5 G/DL (ref 14–18)
IMM GRANULOCYTES # BLD AUTO: 0.05 X10(3)/MCL (ref 0–0.04)
IMM GRANULOCYTES NFR BLD AUTO: 0.4 %
LYMPHOCYTES # BLD AUTO: 1.99 X10(3)/MCL (ref 0.6–4.6)
LYMPHOCYTES NFR BLD AUTO: 15.4 %
MAGNESIUM SERPL-MCNC: 2.6 MG/DL (ref 1.6–2.6)
MCH RBC QN AUTO: 29.7 PG (ref 27–31)
MCHC RBC AUTO-ENTMCNC: 31 G/DL (ref 33–36)
MCV RBC AUTO: 95.6 FL (ref 80–94)
MONOCYTES # BLD AUTO: 0.56 X10(3)/MCL (ref 0.1–1.3)
MONOCYTES NFR BLD AUTO: 4.3 %
NEUTROPHILS # BLD AUTO: 10.13 X10(3)/MCL (ref 2.1–9.2)
NEUTROPHILS NFR BLD AUTO: 78.4 %
NRBC BLD AUTO-RTO: 0 %
PHOSPHATE SERPL-MCNC: 3.7 MG/DL (ref 2.3–4.7)
PLATELET # BLD AUTO: 955 X10(3)/MCL (ref 130–400)
PMV BLD AUTO: 9.6 FL (ref 7.4–10.4)
POCT GLUCOSE: 114 MG/DL (ref 70–110)
POTASSIUM SERPL-SCNC: 3.7 MMOL/L (ref 3.5–5.1)
PROT SERPL-MCNC: 7.3 GM/DL (ref 6.4–8.3)
RBC # BLD AUTO: 3.2 X10(6)/MCL (ref 4.7–6.1)
SODIUM SERPL-SCNC: 142 MMOL/L (ref 136–145)
WBC # BLD AUTO: 12.92 X10(3)/MCL (ref 4.5–11.5)

## 2024-12-03 PROCEDURE — 97530 THERAPEUTIC ACTIVITIES: CPT | Mod: CQ

## 2024-12-03 PROCEDURE — 63600175 PHARM REV CODE 636 W HCPCS: Performed by: SURGERY

## 2024-12-03 PROCEDURE — 25000003 PHARM REV CODE 250: Performed by: NURSE PRACTITIONER

## 2024-12-03 PROCEDURE — 85025 COMPLETE CBC W/AUTO DIFF WBC: CPT

## 2024-12-03 PROCEDURE — 80053 COMPREHEN METABOLIC PANEL: CPT

## 2024-12-03 PROCEDURE — 83735 ASSAY OF MAGNESIUM: CPT

## 2024-12-03 PROCEDURE — 36415 COLL VENOUS BLD VENIPUNCTURE: CPT

## 2024-12-03 PROCEDURE — 99233 SBSQ HOSP IP/OBS HIGH 50: CPT | Mod: ,,, | Performed by: SURGERY

## 2024-12-03 PROCEDURE — 84100 ASSAY OF PHOSPHORUS: CPT

## 2024-12-03 PROCEDURE — 97535 SELF CARE MNGMENT TRAINING: CPT | Mod: CO

## 2024-12-03 PROCEDURE — 25000003 PHARM REV CODE 250

## 2024-12-03 PROCEDURE — 11000001 HC ACUTE MED/SURG PRIVATE ROOM

## 2024-12-03 PROCEDURE — 94799 UNLISTED PULMONARY SVC/PX: CPT | Mod: XB

## 2024-12-03 PROCEDURE — 99900035 HC TECH TIME PER 15 MIN (STAT)

## 2024-12-03 RX ORDER — PROPRANOLOL HYDROCHLORIDE 20 MG/1
20 TABLET ORAL 2 TIMES DAILY
Status: DISCONTINUED | OUTPATIENT
Start: 2024-12-03 | End: 2024-12-05 | Stop reason: HOSPADM

## 2024-12-03 RX ORDER — BISACODYL 10 MG/1
10 SUPPOSITORY RECTAL ONCE
Status: COMPLETED | OUTPATIENT
Start: 2024-12-03 | End: 2024-12-03

## 2024-12-03 RX ORDER — AMANTADINE HYDROCHLORIDE 50 MG/5ML
100 SOLUTION ORAL DAILY
Status: DISCONTINUED | OUTPATIENT
Start: 2024-12-03 | End: 2024-12-05 | Stop reason: HOSPADM

## 2024-12-03 RX ORDER — ACETAMINOPHEN 325 MG/1
650 TABLET ORAL EVERY 6 HOURS PRN
Status: DISCONTINUED | OUTPATIENT
Start: 2024-12-03 | End: 2024-12-05 | Stop reason: HOSPADM

## 2024-12-03 RX ORDER — GABAPENTIN 300 MG/1
300 CAPSULE ORAL 3 TIMES DAILY
Status: DISCONTINUED | OUTPATIENT
Start: 2024-12-03 | End: 2024-12-05 | Stop reason: HOSPADM

## 2024-12-03 RX ORDER — SENNOSIDES 8.8 MG/5ML
5 LIQUID ORAL DAILY
Status: DISCONTINUED | OUTPATIENT
Start: 2024-12-03 | End: 2024-12-03

## 2024-12-03 RX ORDER — METHOCARBAMOL 500 MG/1
500 TABLET, FILM COATED ORAL EVERY 8 HOURS
Status: DISCONTINUED | OUTPATIENT
Start: 2024-12-03 | End: 2024-12-05 | Stop reason: HOSPADM

## 2024-12-03 RX ORDER — OXYCODONE HYDROCHLORIDE 5 MG/1
5 TABLET ORAL EVERY 4 HOURS PRN
Status: DISCONTINUED | OUTPATIENT
Start: 2024-12-03 | End: 2024-12-05 | Stop reason: HOSPADM

## 2024-12-03 RX ORDER — POLYETHYLENE GLYCOL 3350 17 G/17G
17 POWDER, FOR SOLUTION ORAL 2 TIMES DAILY
Status: DISCONTINUED | OUTPATIENT
Start: 2024-12-03 | End: 2024-12-05 | Stop reason: HOSPADM

## 2024-12-03 RX ORDER — NAPROXEN SODIUM 220 MG/1
81 TABLET, FILM COATED ORAL DAILY
Status: DISCONTINUED | OUTPATIENT
Start: 2024-12-03 | End: 2024-12-05 | Stop reason: HOSPADM

## 2024-12-03 RX ORDER — SYRING-NEEDL,DISP,INSUL,0.3 ML 29 G X1/2"
296 SYRINGE, EMPTY DISPOSABLE MISCELLANEOUS ONCE
Status: COMPLETED | OUTPATIENT
Start: 2024-12-03 | End: 2024-12-03

## 2024-12-03 RX ORDER — AMOXICILLIN 250 MG
1 CAPSULE ORAL 2 TIMES DAILY
Status: DISCONTINUED | OUTPATIENT
Start: 2024-12-03 | End: 2024-12-05 | Stop reason: HOSPADM

## 2024-12-03 RX ADMIN — GABAPENTIN 300 MG: 300 CAPSULE ORAL at 08:12

## 2024-12-03 RX ADMIN — AMANTADINE HYDROCHLORIDE 100 MG: 50 SOLUTION ORAL at 08:12

## 2024-12-03 RX ADMIN — METHOCARBAMOL 500 MG: 500 TABLET ORAL at 05:12

## 2024-12-03 RX ADMIN — MAGNESIUM CITRATE 296 ML: 1.75 LIQUID ORAL at 06:12

## 2024-12-03 RX ADMIN — BISACODYL 10 MG: 10 SUPPOSITORY RECTAL at 05:12

## 2024-12-03 RX ADMIN — ASPIRIN 81 MG CHEWABLE TABLET 81 MG: 81 TABLET CHEWABLE at 08:12

## 2024-12-03 RX ADMIN — Medication 6 MG: at 10:12

## 2024-12-03 RX ADMIN — METHOCARBAMOL 500 MG: 500 TABLET ORAL at 10:12

## 2024-12-03 RX ADMIN — CHLORHEXIDINE GLUCONATE 0.12% ORAL RINSE 15 ML: 1.2 LIQUID ORAL at 10:12

## 2024-12-03 RX ADMIN — SENNOSIDES AND DOCUSATE SODIUM 1 TABLET: 50; 8.6 TABLET ORAL at 08:12

## 2024-12-03 RX ADMIN — ENOXAPARIN SODIUM 40 MG: 40 INJECTION SUBCUTANEOUS at 10:12

## 2024-12-03 RX ADMIN — GABAPENTIN 300 MG: 300 CAPSULE ORAL at 10:12

## 2024-12-03 RX ADMIN — ENOXAPARIN SODIUM 40 MG: 40 INJECTION SUBCUTANEOUS at 08:12

## 2024-12-03 RX ADMIN — CHLORHEXIDINE GLUCONATE 0.12% ORAL RINSE 15 ML: 1.2 LIQUID ORAL at 08:12

## 2024-12-03 RX ADMIN — PROPRANOLOL HYDROCHLORIDE 20 MG: 20 TABLET ORAL at 10:12

## 2024-12-03 RX ADMIN — POLYETHYLENE GLYCOL 3350 17 G: 17 POWDER, FOR SOLUTION ORAL at 08:12

## 2024-12-03 RX ADMIN — SENNOSIDES AND DOCUSATE SODIUM 1 TABLET: 50; 8.6 TABLET ORAL at 10:12

## 2024-12-03 RX ADMIN — POLYETHYLENE GLYCOL 3350 17 G: 17 POWDER, FOR SOLUTION ORAL at 10:12
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
## 2024-12-03 NOTE — PLAN OF CARE
F/u with Sera edwards [facility] to update on POC. Pt's NGT has been removed and he is tolerating TFs. Stated she will update rehab MD and keep me posted.     Kaylyn Dye, LCSW    1520 Received update from Crys at [facility] stating their MD is willing to clinically accept pt; however, they need to confirm with family that pt has a solid d/c plan. Stated they were able to get in touch with brother, Panfilo, but he was not willing to provide number for pt's niece, who will be taking care of him at d/c. Panfilo stated niece was at work. JACIEL attempted to contact Panfilo to obtain contact info, but had to leave .     1540 Spoke to Panfilo. Stated he would give me his dtr's number once he was able to obtain later tonight. JACIEL encouraged he call and leave  with info.

## 2024-12-03 NOTE — PT/OT/SLP PROGRESS
[facility]  Speech Language Pathology Department  Diet Tolerance Follow-up    Patient Name:  Payam Grande   MRN:  [id number]  Admitting Diagnosis: TBI    Recommendations     General recommendations:  Speech/Language and Cognitive Evaluation and continue dysphagia therapy as established  Solid texture recommendation:  Puree  Liquid consistency recommendation: Moderately thick liquids - IDDSI Level 3   Medications: crushed in puree  Swallow strategies/precautions: small bites/sips, slow rate, supervision with meals, and assist with feeding as needed  Precautions: aspiration    Diet Tolerance     Nursing reports no difficulty regarding diet tolerance.    Outcome Measures     Functional Oral Intake Scale: 5 - Total oral diet with multiple consistencies, by requiring special preparation or compensations    Plan     SLP Follow-Up:  Yes    Patient to be seen:  5 x/week   Plan of Care expires:  12/16/24 12/03/2024

## 2024-12-04 LAB
ALBUMIN SERPL-MCNC: 2.9 G/DL (ref 3.5–5)
ALBUMIN/GLOB SERPL: 0.8 RATIO (ref 1.1–2)
ALP SERPL-CCNC: 123 UNIT/L (ref 40–150)
ALT SERPL-CCNC: 38 UNIT/L (ref 0–55)
ANION GAP SERPL CALC-SCNC: 11 MEQ/L
AST SERPL-CCNC: 41 UNIT/L (ref 5–34)
BASOPHILS # BLD AUTO: 0.06 X10(3)/MCL
BASOPHILS NFR BLD AUTO: 0.6 %
BILIRUB SERPL-MCNC: 0.3 MG/DL
BUN SERPL-MCNC: 21.6 MG/DL (ref 8.9–20.6)
CALCIUM SERPL-MCNC: 8.9 MG/DL (ref 8.4–10.2)
CHLORIDE SERPL-SCNC: 96 MMOL/L (ref 98–107)
CO2 SERPL-SCNC: 29 MMOL/L (ref 22–29)
CREAT SERPL-MCNC: 0.62 MG/DL (ref 0.72–1.25)
CREAT/UREA NIT SERPL: 35
EOSINOPHIL # BLD AUTO: 0.27 X10(3)/MCL (ref 0–0.9)
EOSINOPHIL NFR BLD AUTO: 2.6 %
ERYTHROCYTE [DISTWIDTH] IN BLOOD BY AUTOMATED COUNT: 13.8 % (ref 11.5–17)
GFR SERPLBLD CREATININE-BSD FMLA CKD-EPI: >60 ML/MIN/1.73/M2
GLOBULIN SER-MCNC: 3.8 GM/DL (ref 2.4–3.5)
GLUCOSE SERPL-MCNC: 91 MG/DL (ref 74–100)
HCT VFR BLD AUTO: 26.6 % (ref 42–52)
HGB BLD-MCNC: 8.5 G/DL (ref 14–18)
IMM GRANULOCYTES # BLD AUTO: 0.06 X10(3)/MCL (ref 0–0.04)
IMM GRANULOCYTES NFR BLD AUTO: 0.6 %
LYMPHOCYTES # BLD AUTO: 2.93 X10(3)/MCL (ref 0.6–4.6)
LYMPHOCYTES NFR BLD AUTO: 28.4 %
MCH RBC QN AUTO: 30.1 PG (ref 27–31)
MCHC RBC AUTO-ENTMCNC: 32 G/DL (ref 33–36)
MCV RBC AUTO: 94.3 FL (ref 80–94)
MONOCYTES # BLD AUTO: 0.96 X10(3)/MCL (ref 0.1–1.3)
MONOCYTES NFR BLD AUTO: 9.3 %
NEUTROPHILS # BLD AUTO: 6.05 X10(3)/MCL (ref 2.1–9.2)
NEUTROPHILS NFR BLD AUTO: 58.5 %
NRBC BLD AUTO-RTO: 0 %
PLATELET # BLD AUTO: 1514 X10(3)/MCL (ref 130–400)
PLATELETS.RETICULATED NFR BLD AUTO: 1.2 % (ref 0.9–11.2)
PMV BLD AUTO: 8.5 FL (ref 7.4–10.4)
POCT GLUCOSE: 105 MG/DL (ref 70–110)
POCT GLUCOSE: 93 MG/DL (ref 70–110)
POTASSIUM SERPL-SCNC: 4 MMOL/L (ref 3.5–5.1)
PROT SERPL-MCNC: 6.7 GM/DL (ref 6.4–8.3)
RBC # BLD AUTO: 2.82 X10(6)/MCL (ref 4.7–6.1)
SODIUM SERPL-SCNC: 136 MMOL/L (ref 136–145)
WBC # BLD AUTO: 10.33 X10(3)/MCL (ref 4.5–11.5)

## 2024-12-04 PROCEDURE — 85025 COMPLETE CBC W/AUTO DIFF WBC: CPT

## 2024-12-04 PROCEDURE — 25000003 PHARM REV CODE 250

## 2024-12-04 PROCEDURE — 36415 COLL VENOUS BLD VENIPUNCTURE: CPT

## 2024-12-04 PROCEDURE — 11000001 HC ACUTE MED/SURG PRIVATE ROOM

## 2024-12-04 PROCEDURE — 94760 N-INVAS EAR/PLS OXIMETRY 1: CPT

## 2024-12-04 PROCEDURE — 99900035 HC TECH TIME PER 15 MIN (STAT)

## 2024-12-04 PROCEDURE — 94799 UNLISTED PULMONARY SVC/PX: CPT

## 2024-12-04 PROCEDURE — 63600175 PHARM REV CODE 636 W HCPCS: Performed by: SURGERY

## 2024-12-04 PROCEDURE — 80053 COMPREHEN METABOLIC PANEL: CPT

## 2024-12-04 PROCEDURE — 97164 PT RE-EVAL EST PLAN CARE: CPT

## 2024-12-04 PROCEDURE — 99233 SBSQ HOSP IP/OBS HIGH 50: CPT | Mod: ,,, | Performed by: SURGERY

## 2024-12-04 RX ADMIN — METHOCARBAMOL 500 MG: 500 TABLET ORAL at 04:12

## 2024-12-04 RX ADMIN — CHLORHEXIDINE GLUCONATE 0.12% ORAL RINSE 15 ML: 1.2 LIQUID ORAL at 08:12

## 2024-12-04 RX ADMIN — METHOCARBAMOL 500 MG: 500 TABLET ORAL at 10:12

## 2024-12-04 RX ADMIN — Medication 6 MG: at 11:12

## 2024-12-04 RX ADMIN — AMANTADINE HYDROCHLORIDE 100 MG: 50 SOLUTION ORAL at 08:12

## 2024-12-04 RX ADMIN — ASPIRIN 81 MG CHEWABLE TABLET 81 MG: 81 TABLET CHEWABLE at 08:12

## 2024-12-04 RX ADMIN — GABAPENTIN 300 MG: 300 CAPSULE ORAL at 08:12

## 2024-12-04 RX ADMIN — SENNOSIDES AND DOCUSATE SODIUM 1 TABLET: 50; 8.6 TABLET ORAL at 08:12

## 2024-12-04 RX ADMIN — OXYCODONE HYDROCHLORIDE 5 MG: 5 TABLET ORAL at 11:12

## 2024-12-04 RX ADMIN — METHOCARBAMOL 500 MG: 500 TABLET ORAL at 02:12

## 2024-12-04 RX ADMIN — POLYETHYLENE GLYCOL 3350 17 G: 17 POWDER, FOR SOLUTION ORAL at 08:12

## 2024-12-04 RX ADMIN — ENOXAPARIN SODIUM 40 MG: 40 INJECTION SUBCUTANEOUS at 08:12

## 2024-12-04 RX ADMIN — GABAPENTIN 300 MG: 300 CAPSULE ORAL at 02:12
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
## 2024-12-04 NOTE — PROGRESS NOTES
Inpatient Nutrition Assessment    Admit Date: 11/18/2024   Total duration of encounter: 16 days   Patient Age: 49 y.o.    Nutrition Recommendation/Prescription     Diet per SLP recs; currently Diet Pureed (IDDSI Level 4) Supervision with Meals; Moderately Thick Liquids (IDDSI Level 3)     Magic Cup with meals (290 kcal, 9 gm protein per cup)    Communication of Recommendations: reviewed with nurse    Nutrition Assessment     Malnutrition Assessment/Nutrition-Focused Physical Exam       Malnutrition Level: other (see comments) (Does not meet criteria) (11/21/24 0833)  Energy Intake (Malnutrition): other (see comments) (Unable to assess) (11/21/24 0833)  Weight Loss (Malnutrition): other (see comments) (Unable to assess) (11/21/24 0833)              Muscle Mass (Malnutrition): mild depletion (11/21/24 0833)  [place] Region (Muscle Loss): mild depletion  Clavicle Bone Region (Muscle Loss): mild depletion                    Fluid Accumulation (Malnutrition): other (see comments) (Not present) (11/21/24 0833)     Hand  Strength, Right (Malnutrition): Unable to assess (11/21/24 0833)  A minimum of two characteristics is recommended for diagnosis of either severe or non-severe malnutrition.    Chart Review    Reason Seen: continuous nutrition monitoring    Malnutrition Screening Tool Results   Have you recently lost weight without trying?: Unsure  Have you been eating poorly because of a decreased appetite?: No (MARC)   MST Score: 2   Diagnosis:  SAH  Right tibia fracture  Right fibula fracture  Laceration to right elbow  Complete tear of lateral collateral ligament of right elbow  Laceration of extensor tendon of right forearm  SDH    Relevant Medical History: none noted    Scheduled Medications:  amantadine HCL, 100 mg, Daily  aspirin, 81 mg, Daily  chlorhexidine, 15 mL, BID  enoxparin, 40 mg, Q12H (prophylaxis, 0900/2100)  gabapentin, 300 mg, TID  methocarbamoL, 500 mg, Q8H  polyethylene glycol, 17 g,  BID  propranoloL, 20 mg, BID  senna-docusate 8.6-50 mg, 1 tablet, BID    Continuous Infusions:       PRN Medications:  acetaminophen, 650 mg, Q6H PRN  bisacodyL, 10 mg, Daily PRN  melatonin, 6 mg, Nightly PRN  oxyCODONE, 5 mg, Q4H PRN  ziprasidone, 20 mg, Q4H PRN    Calorie Containing IV Medications: no significant kcals from medications at this time    Recent Labs   Lab 11/28/24  0440 11/28/24  1536 11/29/24  0536 11/30/24  0526 12/01/24  1007 12/02/24  0503 12/03/24  1311 12/04/24  0459   * 135*  --  138 139 141 142 136   K 5.7* 4.5  --  5.1 5.1 5.0 3.7 4.0   CALCIUM 8.9 9.2  --  9.0 9.5 9.1 9.8 8.9   PHOS  --   --   --   --   --   --  3.7  --    MG  --   --   --   --   --   --  2.60  --    CL 97* 95*  --  93* 99 101 100 96*   CO2 28 28  --  34* 31* 31* 29 29   BUN 21.4* 17.6  --  23.1* 25.5* 27.7* 29.7* 21.6*   CREATININE 0.57* 0.58*  --  0.68* 0.64* 0.69* 0.67* 0.62*   EGFRNORACEVR >60 >60  --  >60 >60 >60 >60 >60   GLUCOSE 101* 112*  --  100 119* 105* 82 91   BILITOT 0.3  --   --  0.3 0.3 0.2 0.3 0.3   ALKPHOS 145  --   --  146 150 144 145 123   *  --   --  93* 77* 63* 44 38   AST 96*  --   --  69* 63* 51* 46* 41*   ALBUMIN 2.9*  --   --  3.1* 3.1* 3.0* 3.0* 2.9*   PREALB 21.1  --   --   --   --  28.2  --   --    CRP 71.00*  --   --   --   --  23.00*  --   --    WBC 18.59*  --  22.74* 18.81* 18.83* 14.95* 12.92* 10.33   HGB 8.8*  --  8.7* 8.7* 8.7* 8.8* 9.5* 8.5*   HCT 27.0*  --  26.6* 26.8* 26.5* 27.3* 30.6* 26.6*     Nutrition Orders:  Diet Pureed (IDDSI Level 4) Supervision with Meals; Moderately Thick Liquids (IDDSI Level 3)      Appetite/Oral Intake: good/% of meals  Factors Affecting Nutritional Intake: none identified  Social Needs Impacting Access to Food: unable to assess at this time; will attempt on follow-up  Food/[employer]/Cultural Preferences: none reported  Food Allergies: unable to obtain  Last Bowel Movement: 12/03/24  Wound(s):     Wound 11/19/24 1141 Abrasion(s) Left  "lateral;upper Back-Tissue loss description: Partial thickness     Comments    11/19/24: Pt recently extubated. Discussed with RN, plans for NG placement. Will provide TF recommendations in case needed. Pt on able to verify subjective info at time of RD visit due to confusion.    11/21/24: TF continues. Pt with some spitting up yesterday, but now back to goal rate.     11/26/24 pt tolerating tube feeding per nursing    11/29/24 pt tolerating tube feeding per nursing    12/4/24 Pt started on oral diet 12/2, so far tolerating, eating % of most meals. Will add Magic Cup for additional nutrition.    Anthropometrics    Height: 5' 7.99" (172.7 cm), Height Method: Estimated  Last Weight: 68 kg (149 lb 14.6 oz) (11/18/24 0900), Weight Method: Bed Scale  BMI (Calculated): 22.8  BMI Classification: normal (BMI 18.5-24.9)        Ideal Body Weight (IBW), Male: 153.94 lb     % Ideal Body Weight, Male (lb): 97.34 %                          Usual Weight Provided By: unable to obtain usual weight    Wt Readings from Last 5 Encounters:   11/18/24 68 kg (149 lb 14.6 oz)     Weight Change(s) Since Admission:   Wt Readings from Last 1 Encounters:   11/18/24 0900 68 kg (149 lb 14.6 oz)   11/18/24 0705 68 kg (150 lb)   Admit Weight: 68 kg (150 lb) (11/18/24 0705), Weight Method: Estimated    Estimated Needs    Weight Used For Calorie Calculations: 68 kg (149 lb 14.6 oz)  Energy Calorie Requirements (kcal): 1981kcal (1.3 stress factor)  Energy Need Method: [facility]  Weight Used For Protein Calculations: 68 kg (149 lb 14.6 oz)  Protein Requirements: 95-109gm (1.4-1.6g/kg)  Fluid Requirements (mL): 2040ml (30ml/kg)  CHO Requirement: 225gm (45% est kcal needs)     Enteral Nutrition     Patient not receiving enteral nutrition support at this time.    Parenteral Nutrition     Patient not receiving parenteral nutrition support at this time.    Evaluation of Received Nutrient Intake    Calories: meeting estimated needs  Protein: " meeting estimated needs    Patient Education     Not applicable.    Nutrition Diagnosis     PES: Inadequate oral intake related to acute illness as evidenced by NPO since admit (recently extubated). (resolved)     PES:            Nutrition Interventions     Intervention(s): collaboration with other providers  Intervention(s): Enteral nutrition    Goal: Meet greater than 80% of nutritional needs by follow-up. (goal met)  Goal: Tolerate enteral feeding at goal rate by follow-up. (goal met)    Nutrition Goals & Monitoring     Dietitian will monitor: energy intake and weight change  Discharge planning:  regular diet with texture modifications per SLP  Nutrition Risk/Follow-Up: low (follow-up in 5-7 days)   Please consult if re-assessment needed sooner.

## 2024-12-05 VITALS
WEIGHT: 149.94 LBS | SYSTOLIC BLOOD PRESSURE: 93 MMHG | BODY MASS INDEX: 22.72 KG/M2 | DIASTOLIC BLOOD PRESSURE: 56 MMHG | HEIGHT: 68 IN | HEART RATE: 83 BPM | RESPIRATION RATE: 16 BRPM | TEMPERATURE: 99 F | OXYGEN SATURATION: 98 %

## 2024-12-05 PROCEDURE — 63600175 PHARM REV CODE 636 W HCPCS: Performed by: SURGERY

## 2024-12-05 PROCEDURE — 25000003 PHARM REV CODE 250

## 2024-12-05 RX ORDER — PROPRANOLOL HYDROCHLORIDE 20 MG/1
20 TABLET ORAL 2 TIMES DAILY
Start: 2024-12-05 | End: 2025-12-05

## 2024-12-05 RX ORDER — NAPROXEN SODIUM 220 MG/1
81 TABLET, FILM COATED ORAL DAILY
Start: 2024-12-05 | End: 2025-12-05

## 2024-12-05 RX ORDER — POLYETHYLENE GLYCOL 3350 17 G/17G
17 POWDER, FOR SOLUTION ORAL 2 TIMES DAILY
Start: 2024-12-05

## 2024-12-05 RX ORDER — OXYCODONE HYDROCHLORIDE 5 MG/1
5 TABLET ORAL EVERY 4 HOURS PRN
Start: 2024-12-05

## 2024-12-05 RX ORDER — ENOXAPARIN SODIUM 100 MG/ML
40 INJECTION SUBCUTANEOUS EVERY 12 HOURS
Start: 2024-12-05

## 2024-12-05 RX ORDER — GABAPENTIN 300 MG/1
300 CAPSULE ORAL 3 TIMES DAILY
Start: 2024-12-05 | End: 2025-12-05

## 2024-12-05 RX ORDER — AMANTADINE HYDROCHLORIDE 50 MG/5ML
100 SOLUTION ORAL DAILY
Start: 2024-12-05 | End: 2025-12-05

## 2024-12-05 RX ORDER — METHOCARBAMOL 500 MG/1
500 TABLET, FILM COATED ORAL EVERY 8 HOURS
Start: 2024-12-05 | End: 2024-12-15

## 2024-12-05 RX ADMIN — GABAPENTIN 300 MG: 300 CAPSULE ORAL at 08:12

## 2024-12-05 RX ADMIN — OXYCODONE HYDROCHLORIDE 5 MG: 5 TABLET ORAL at 05:12

## 2024-12-05 RX ADMIN — POLYETHYLENE GLYCOL 3350 17 G: 17 POWDER, FOR SOLUTION ORAL at 08:12

## 2024-12-05 RX ADMIN — SENNOSIDES AND DOCUSATE SODIUM 1 TABLET: 50; 8.6 TABLET ORAL at 08:12

## 2024-12-05 RX ADMIN — AMANTADINE HYDROCHLORIDE 100 MG: 50 SOLUTION ORAL at 08:12

## 2024-12-05 RX ADMIN — METHOCARBAMOL 500 MG: 500 TABLET ORAL at 05:12

## 2024-12-05 RX ADMIN — ENOXAPARIN SODIUM 40 MG: 40 INJECTION SUBCUTANEOUS at 08:12

## 2024-12-05 RX ADMIN — ASPIRIN 81 MG CHEWABLE TABLET 81 MG: 81 TABLET CHEWABLE at 08:12

## 2024-12-05 RX ADMIN — CHLORHEXIDINE GLUCONATE 0.12% ORAL RINSE 15 ML: 1.2 LIQUID ORAL at 08:12
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
## 2024-12-05 NOTE — PLAN OF CARE
12/05/24 0935   Final Note   Assessment Type Final Discharge Note   Anticipated Discharge Disposition Rehab   Hospital Resources/Appts/Education Provided Post-Acute resouces added to AVS   Post-Acute Status   Post-Acute Authorization Placement   Post-Acute Placement Status Set-up Complete/Auth obtained   Discharge Delays None known at this time     Pt discharging to [facility]. Transport via [facility] with 9am pickup. Packet given to nurse. Family updated yesterday afternoon on d/c POC and pickup time.     Kaylyn Dye LCSW

## 2024-12-31 ENCOUNTER — TELEPHONE (OUTPATIENT)
Facility: CLINIC | Age: 49
End: 2024-12-31
Payer: MEDICARE

## 2024-12-31 NOTE — TELEPHONE ENCOUNTER
Attempted to contact pt in regards to fu appt. No answer unable to leave voicemail  Will place on recall   Detail Level: Detailed

## (undated) DEVICE — BIT DRILL 4.2MM 3 FLUTD 145MM

## (undated) DEVICE — STAPLER SKIN PROXIMATE WIDE

## (undated) DEVICE — SUT 2/0 30IN PROLENE MONO

## (undated) DEVICE — SPONGE COTTON TRAY 4X4IN

## (undated) DEVICE — SUT ABSRB MONO 2-0 CT-2 27IN

## (undated) DEVICE — DRESSING GAUZE XEROFORM 5X9

## (undated) DEVICE — PADDING 4X4YD SPECIALIST100

## (undated) DEVICE — GLOVE PROTEXIS LTX MICRO 8

## (undated) DEVICE — PAD CAST 6X4YD SPECIALISTIC

## (undated) DEVICE — GLOVE SENSICARE PI GRN 6.5

## (undated) DEVICE — PADDING WYTEX UNDRCST 6INX4YD

## (undated) DEVICE — BANDAGE COMPR VELCLOSE 4INX5YD

## (undated) DEVICE — Device

## (undated) DEVICE — GOWN SMARTSLEEVE AAMI LVL4 XL

## (undated) DEVICE — SUT PDS PLUS 1 TP1 96IN

## (undated) DEVICE — DRAPE ORTH SPLIT 77X108IN

## (undated) DEVICE — GLOVE SIGNATURE MICRO LTX 6.5

## (undated) DEVICE — DRAPE STERI U-SHAPED 47X51IN

## (undated) DEVICE — WIRE GUIDE 3.2MM 400MM
Type: IMPLANTABLE DEVICE | Site: TIBIA | Status: NON-FUNCTIONAL
Removed: 2024-11-18

## (undated) DEVICE — SUT VICRYL BR 1 GEN 27 CT-1

## (undated) DEVICE — ELECTRODE REM POLYHESIVE II

## (undated) DEVICE — DRAPE C-ARMOR EQUIPMENT COVER

## (undated) DEVICE — DRESSING XEROFORM 5X9IN

## (undated) DEVICE — PAD ABDOMINAL STERILE 8X10IN

## (undated) DEVICE — APPLICATOR CHLORAPREP ORN 26ML

## (undated) DEVICE — COVER TABLE HVY DTY 60X90IN

## (undated) DEVICE — COVER SHOE FLUID RESISTANT XL

## (undated) DEVICE — BANDAGE COMPR 6IN HOOK 6INX5YD

## (undated) DEVICE — BANDAGE ACE DOUBLE STER 6IN

## (undated) DEVICE — SLEEVE OTR PROTCT STRL 12MM

## (undated) DEVICE — BIT DRILL XLN 4.2MM

## (undated) DEVICE — SPONGE GAUZE 4X4 12 PLY STRL

## (undated) DEVICE — SUT CTD VICRYL CT-1 27

## (undated) DEVICE — COVER FULLGUARD SHOE HIGH-TOP

## (undated) DEVICE — TAPE SILK 3IN

## (undated) DEVICE — DRAPE HAND STERILE

## (undated) DEVICE — SET CYSTO IRR DRP CHMBR 84IN

## (undated) DEVICE — SUT VICRYL PLUS ANTIBACT

## (undated) DEVICE — GLOVE 8 PROTEXIS PI ORTHO

## (undated) DEVICE — PADDING WYTEX UNDRCST 4INX4YD